# Patient Record
Sex: MALE | Race: WHITE | NOT HISPANIC OR LATINO | Employment: FULL TIME | URBAN - NONMETROPOLITAN AREA
[De-identification: names, ages, dates, MRNs, and addresses within clinical notes are randomized per-mention and may not be internally consistent; named-entity substitution may affect disease eponyms.]

---

## 2017-01-04 ENCOUNTER — OFFICE VISIT (OUTPATIENT)
Dept: CARDIOLOGY | Facility: CLINIC | Age: 52
End: 2017-01-04

## 2017-01-04 VITALS
OXYGEN SATURATION: 98 % | WEIGHT: 188.5 LBS | DIASTOLIC BLOOD PRESSURE: 79 MMHG | BODY MASS INDEX: 24.98 KG/M2 | HEART RATE: 65 BPM | SYSTOLIC BLOOD PRESSURE: 116 MMHG | HEIGHT: 73 IN

## 2017-01-04 DIAGNOSIS — I25.2 OLD MI (MYOCARDIAL INFARCTION): ICD-10-CM

## 2017-01-04 DIAGNOSIS — I25.10 CORONARY ARTERY DISEASE INVOLVING NATIVE CORONARY ARTERY OF NATIVE HEART WITHOUT ANGINA PECTORIS: Primary | ICD-10-CM

## 2017-01-04 PROCEDURE — 99213 OFFICE O/P EST LOW 20 MIN: CPT | Performed by: INTERNAL MEDICINE

## 2017-01-04 NOTE — MR AVS SNAPSHOT
Claude E Clement   1/4/2017 12:40 PM   Office Visit    Dept Phone:  [phone]   Encounter #:  [id number]    Provider:  Cameron Villa DO   Department:  [facility] CARDIOLOGY                Your Full Care Plan              Today's Medication Changes          These changes are accurate as of: 1/4/17  1:09 PM.  If you have any questions, ask your nurse or doctor.               Medication(s)that have changed:     aspirin  MG tablet   Take 1 tablet by mouth Daily.   What changed:  Another medication with the same name was removed. Continue taking this medication, and follow the directions you see here.       atorvastatin 80 MG tablet   Commonly known as:  LIPITOR   Take 1 tablet by mouth Daily.   What changed:  Another medication with the same name was removed. Continue taking this medication, and follow the directions you see here.       carvedilol 3.125 MG tablet   Commonly known as:  COREG   Take 1 tablet by mouth 2 (Two) Times a Day.   What changed:  Another medication with the same name was removed. Continue taking this medication, and follow the directions you see here.       clopidogrel 75 MG tablet   Commonly known as:  PLAVIX   Take 1 tablet by mouth Daily.   What changed:  Another medication with the same name was removed. Continue taking this medication, and follow the directions you see here.       lisinopril 10 MG tablet   Commonly known as:  PRINIVIL,ZESTRIL   Take 1 tablet by mouth Daily.   What changed:  Another medication with the same name was removed. Continue taking this medication, and follow the directions you see here.         Stop taking medication(s)listed here:     HYDROcodone-acetaminophen 5-325 MG per tablet   Commonly known as:  NORCO   Stopped by:  Cameron Villa DO                      Your Updated Medication List          This list is accurate as of: 1/4/17  1:09 PM.  Always use your most recent med list.                aspirin  MG  "tablet   Take 1 tablet by mouth Daily.       atorvastatin 80 MG tablet   Commonly known as:  LIPITOR   Take 1 tablet by mouth Daily.       carvedilol 3.125 MG tablet   Commonly known as:  COREG   Take 1 tablet by mouth 2 (Two) Times a Day.       clopidogrel 75 MG tablet   Commonly known as:  PLAVIX   Take 1 tablet by mouth Daily.       gabapentin 600 MG tablet   Commonly known as:  NEURONTIN       lisinopril 10 MG tablet   Commonly known as:  PRINIVIL,ZESTRIL   Take 1 tablet by mouth Daily.               You Were Diagnosed With        Codes Comments    Coronary artery disease involving native coronary artery of native heart without angina pectoris    -  Primary ICD-10-CM: I25.10  ICD-9-CM: 414.01     Old MI (myocardial infarction)     ICD-10-CM: I25.2  ICD-9-CM: 412       Instructions     None    Patient Instructions History      Upcoming Appointments     Visit Type Date Time Department    FOLLOW UP 1/4/2017 12:40 PM [facility]    FOLLOW UP 5/4/2017 12:00 PM [facility] [place] [place] [facility]      MyChart Signup     Our records indicate that you have declined [facility] [employer]hart signup. If you would like to sign up for [employer], please email [email or web address] or call [phone] to obtain an activation code.             Other Info from Your Visit           Your Appointments     May 04, 2017 12:00 PM EDT   Follow Up with Cameron Villa DO   [facility] MEDICAL [facility] CARDIOLOGY (--)    [address] [place] [id number] [phone]           Arrive 15 minutes prior to appointment.              Allergies     No Known Allergies      Reason for Visit     Follow-up OLD MI      Vital Signs     Blood Pressure Pulse Height Weight Oxygen Saturation Body Mass Index    116/79 (BP Location: Right arm, Patient Position: Sitting) 65 73\" (185.4 cm) 188 lb 8 oz (85.5 kg) 98% 24.87 kg/m2    Smoking Status                   Current Every Day Smoker           Problems and Diagnoses Noted     Coronary artery " disease involving native coronary artery of native heart    Old MI (myocardial infarction)

## 2017-01-04 NOTE — PROGRESS NOTES
Subjective   NAME:    Claude E Campbell   :      1965  DATE:    2017    Claude E Campbell is a 51-year-old  male who had a inferior lateral myocardial infarction in 2016.  He underwent percutaneous stent placement to his right coronary.  He denies any further chest discomfort.  Although he has different sensations that occur.  He is active back at work.  He admits to smoking a half a pack cigarettes a day and drinking a sixpack a day which is down from 16 years day.    REASON FOR VISIT:  Chief Complaint   Patient presents with   • Follow-up     OLD MI       HISTORY:  PAST MEDICAL HISTORY:   Past Medical History   Diagnosis Date   • Myocardial infarction        SURGICAL HISTORY:   Past Surgical History   Procedure Laterality Date   • Coronary stent placement         SOCIAL HISTORY:   Social History     Social History   • Marital status:      Spouse name: N/A   • Number of children: N/A   • Years of education: N/A     Social History Main Topics   • Smoking status: Current Every Day Smoker     Packs/day: 0.75     Types: Cigarettes   • Smokeless tobacco: Current User     Types: Snuff   • Alcohol use 3.6 oz/week     6 Cans of beer per week      Comment: COUPLE PER DAY    • Drug use: No   • Sexual activity: Not Asked     Other Topics Concern   • None     Social History Narrative       FAMILY HISTORY:   Family History   Problem Relation Age of Onset   • Heart attack Father    • Heart attack Sister      X2   • Heart disease Brother    • Coronary artery disease Other        REVIEW OF SYSTEMS:  Review of Systems   Constitutional: Negative for activity change, appetite change and fatigue.   HENT: Negative for congestion and tinnitus.    Eyes: Positive for visual disturbance.   Respiratory: Negative for cough, chest tightness and shortness of breath.    Cardiovascular: Negative for chest pain, palpitations and leg swelling.   Gastrointestinal: Negative for blood in stool, nausea and  "vomiting.   Endocrine: Negative for cold intolerance, heat intolerance and polyuria.   Genitourinary: Negative for dysuria.   Musculoskeletal: Negative for myalgias and neck pain.   Skin: Negative for rash.   Neurological: Negative for dizziness, syncope, weakness and light-headedness.   Hematological: Does not bruise/bleed easily.   Psychiatric/Behavioral: Negative for confusion and sleep disturbance.       Objective       PHYSICAL EXAMINATION:  Physical Exam   Constitutional: He is oriented to person, place, and time. He appears well-developed and well-nourished.   HENT:   Head: Normocephalic and atraumatic.   Eyes: Conjunctivae and EOM are normal. Pupils are equal, round, and reactive to light.   Neck: Normal range of motion. Neck supple. No JVD present. No tracheal deviation present. No thyromegaly present.   Cardiovascular: Normal rate, regular rhythm, normal heart sounds and intact distal pulses.  Exam reveals no gallop and no friction rub.    No murmur heard.  Pulmonary/Chest: Effort normal and breath sounds normal. No respiratory distress. He has no wheezes. He has no rales. He exhibits no tenderness.   Abdominal: Soft. Bowel sounds are normal. He exhibits no distension and no mass. There is no tenderness. No hernia.   Musculoskeletal: Normal range of motion. He exhibits no edema, tenderness or deformity.   Lymphadenopathy:     He has no cervical adenopathy.   Neurological: He is alert and oriented to person, place, and time. He has normal reflexes. He displays normal reflexes. No cranial nerve deficit. He exhibits normal muscle tone. Coordination normal.   Skin: Skin is warm and dry.   Psychiatric: He has a normal mood and affect. His behavior is normal. Judgment and thought content normal.       VITAL SIGNS:   Visit Vitals   • /79 (BP Location: Right arm, Patient Position: Sitting)   • Pulse 65   • Ht 73\" (185.4 cm)   • Wt 188 lb 8 oz (85.5 kg)   • SpO2 98%   • BMI 24.87 kg/m2       Procedure "   Procedures         Assessment/Plan     Problems Addressed this Visit        Cardiovascular and Mediastinum    Coronary artery disease involving native coronary artery of native heart - Primary    Relevant Orders    Stress Test With Myocardial Perfusion One Day    Old MI (myocardial infarction)    Relevant Orders    Stress Test With Myocardial Perfusion One Day           Orders Placed This Encounter   Procedures   • Stress Test With Myocardial Perfusion One Day     Standing Status:   Future     Standing Expiration Date:   1/4/2018     Order Specific Question:   What stress agent will be used?     Answer:   Exercise with possible pharmacologic     Order Specific Question:   Reason for exam?     Answer:   Known Coronary Artery Disease    1.  Continue current medical regimen.  2.  We recommended he decrease his alcohol tobacco intake.  3.  We recommend that he increase his level of aerobic activity.  4.  We will schedule him for a treadmill stress test in 3 months.    Return in about 4 months (around 5/4/2017).    Current Outpatient Prescriptions:   •  aspirin  MG tablet, Take 1 tablet by mouth Daily., Disp: 30 tablet, Rfl: 11  •  atorvastatin (LIPITOR) 80 MG tablet, Take 1 tablet by mouth Daily., Disp: 30 tablet, Rfl: 11  •  carvedilol (COREG) 3.125 MG tablet, Take 1 tablet by mouth 2 (Two) Times a Day., Disp: 60 tablet, Rfl: 11  •  clopidogrel (PLAVIX) 75 MG tablet, Take 1 tablet by mouth Daily., Disp: 30 tablet, Rfl: 11  •  lisinopril (PRINIVIL,ZESTRIL) 10 MG tablet, Take 1 tablet by mouth Daily., Disp: 30 tablet, Rfl: 11  •  gabapentin (NEURONTIN) 600 MG tablet, Take 600 mg by mouth 3 (three) times a day., Disp: , Rfl:                  Cameron Villa, , [facility], FACC, FACOI, FCCP

## 2017-03-21 RX ORDER — ATORVASTATIN CALCIUM 80 MG/1
80 TABLET, FILM COATED ORAL DAILY
Qty: 30 TABLET | Refills: 11 | Status: SHIPPED | OUTPATIENT
Start: 2017-03-21 | End: 2018-04-12 | Stop reason: SDUPTHER

## 2017-03-21 RX ORDER — LISINOPRIL 10 MG/1
10 TABLET ORAL DAILY
Qty: 30 TABLET | Refills: 11 | Status: SHIPPED | OUTPATIENT
Start: 2017-03-21 | End: 2018-04-12 | Stop reason: SDUPTHER

## 2017-03-21 RX ORDER — CLOPIDOGREL BISULFATE 75 MG/1
75 TABLET ORAL DAILY
Qty: 30 TABLET | Refills: 11 | Status: SHIPPED | OUTPATIENT
Start: 2017-03-21 | End: 2018-04-12 | Stop reason: SDUPTHER

## 2017-03-21 RX ORDER — ASPIRIN 325 MG
325 TABLET, DELAYED RELEASE (ENTERIC COATED) ORAL DAILY
Qty: 30 TABLET | Refills: 11 | Status: SHIPPED | OUTPATIENT
Start: 2017-03-21 | End: 2018-04-12 | Stop reason: SDUPTHER

## 2017-03-21 RX ORDER — CARVEDILOL 3.12 MG/1
3.12 TABLET ORAL 2 TIMES DAILY
Qty: 60 TABLET | Refills: 11 | Status: SHIPPED | OUTPATIENT
Start: 2017-03-21 | End: 2018-04-12 | Stop reason: SDUPTHER

## 2017-05-16 ENCOUNTER — OFFICE VISIT (OUTPATIENT)
Dept: CARDIOLOGY | Facility: CLINIC | Age: 52
End: 2017-05-16

## 2017-05-16 VITALS
SYSTOLIC BLOOD PRESSURE: 154 MMHG | DIASTOLIC BLOOD PRESSURE: 92 MMHG | HEIGHT: 72 IN | OXYGEN SATURATION: 97 % | BODY MASS INDEX: 24.85 KG/M2 | HEART RATE: 63 BPM | WEIGHT: 183.5 LBS

## 2017-05-16 DIAGNOSIS — R06.02 SHORTNESS OF BREATH: ICD-10-CM

## 2017-05-16 DIAGNOSIS — Z72.0 TOBACCO ABUSE: ICD-10-CM

## 2017-05-16 DIAGNOSIS — E78.2 MIXED HYPERLIPIDEMIA: ICD-10-CM

## 2017-05-16 DIAGNOSIS — I25.10 CORONARY ARTERY DISEASE INVOLVING NATIVE CORONARY ARTERY OF NATIVE HEART WITHOUT ANGINA PECTORIS: Primary | ICD-10-CM

## 2017-05-16 DIAGNOSIS — I10 ESSENTIAL HYPERTENSION: ICD-10-CM

## 2017-05-16 DIAGNOSIS — I25.2 OLD MI (MYOCARDIAL INFARCTION): ICD-10-CM

## 2017-05-16 PROCEDURE — 99214 OFFICE O/P EST MOD 30 MIN: CPT | Performed by: INTERNAL MEDICINE

## 2017-05-20 PROBLEM — Z72.0 TOBACCO ABUSE: Status: ACTIVE | Noted: 2017-05-20

## 2017-05-20 PROBLEM — I10 ESSENTIAL HYPERTENSION: Status: ACTIVE | Noted: 2017-05-20

## 2017-05-20 PROBLEM — E78.2 MIXED HYPERLIPIDEMIA: Status: ACTIVE | Noted: 2017-05-20

## 2017-06-02 ENCOUNTER — HOSPITAL ENCOUNTER (OUTPATIENT)
Dept: CARDIOLOGY | Facility: HOSPITAL | Age: 52
Discharge: HOME | End: 2017-06-02
Attending: INTERNAL MEDICINE | Admitting: INTERNAL MEDICINE

## 2017-06-02 ENCOUNTER — APPOINTMENT (OUTPATIENT)
Dept: CARDIOLOGY | Facility: HOSPITAL | Age: 52
End: 2017-06-02
Attending: INTERNAL MEDICINE

## 2017-06-02 DIAGNOSIS — I25.10 CORONARY ARTERY DISEASE INVOLVING NATIVE CORONARY ARTERY OF NATIVE HEART WITHOUT ANGINA PECTORIS: ICD-10-CM

## 2017-06-02 DIAGNOSIS — R06.02 SHORTNESS OF BREATH: ICD-10-CM

## 2017-06-02 LAB
BH CV ECHO MEAS - ACS: 1.9 CM
BH CV ECHO MEAS - AO ROOT AREA (BSA CORRECTED): 1.5
BH CV ECHO MEAS - AO ROOT AREA: 7.3 CM^2
BH CV ECHO MEAS - AO ROOT DIAM: 3 CM
BH CV ECHO MEAS - BSA(HAYCOCK): 2.1 M^2
BH CV ECHO MEAS - BSA: 2.1 M^2
BH CV ECHO MEAS - BZI_BMI: 24.8 KILOGRAMS/M^2
BH CV ECHO MEAS - BZI_METRIC_HEIGHT: 182.9 CM
BH CV ECHO MEAS - BZI_METRIC_WEIGHT: 83 KG
BH CV ECHO MEAS - CONTRAST EF 4CH: 48.6 ML/M^2
BH CV ECHO MEAS - EDV(CUBED): 217.4 ML
BH CV ECHO MEAS - EDV(MOD-SP4): 138 ML
BH CV ECHO MEAS - EDV(TEICH): 180.9 ML
BH CV ECHO MEAS - EF(CUBED): 61.8 %
BH CV ECHO MEAS - EF(MOD-SP4): 48.6 %
BH CV ECHO MEAS - EF(TEICH): 52.5 %
BH CV ECHO MEAS - ESV(CUBED): 83.1 ML
BH CV ECHO MEAS - ESV(MOD-SP4): 71 ML
BH CV ECHO MEAS - ESV(TEICH): 86 ML
BH CV ECHO MEAS - FS: 27.4 %
BH CV ECHO MEAS - IVS/LVPW: 0.88
BH CV ECHO MEAS - IVSD: 0.84 CM
BH CV ECHO MEAS - LA DIMENSION: 3.6 CM
BH CV ECHO MEAS - LA/AO: 1.2
BH CV ECHO MEAS - LV DIASTOLIC VOL/BSA (35-75): 67.3 ML/M^2
BH CV ECHO MEAS - LV MASS(C)D: 216 GRAMS
BH CV ECHO MEAS - LV MASS(C)DI: 105.3 GRAMS/M^2
BH CV ECHO MEAS - LV SYSTOLIC VOL/BSA (12-30): 34.6 ML/M^2
BH CV ECHO MEAS - LVIDD: 6 CM
BH CV ECHO MEAS - LVIDS: 4.4 CM
BH CV ECHO MEAS - LVLD AP4: 7.8 CM
BH CV ECHO MEAS - LVLS AP4: 7.3 CM
BH CV ECHO MEAS - LVOT AREA (M): 3.1 CM^2
BH CV ECHO MEAS - LVOT AREA: 3.2 CM^2
BH CV ECHO MEAS - LVOT DIAM: 2 CM
BH CV ECHO MEAS - LVPWD: 0.95 CM
BH CV ECHO MEAS - MV A MAX VEL: 17.3 CM/SEC
BH CV ECHO MEAS - MV E MAX VEL: 91.8 CM/SEC
BH CV ECHO MEAS - MV E/A: 5.3
BH CV ECHO MEAS - PA ACC SLOPE: 417.7 CM/SEC^2
BH CV ECHO MEAS - PA ACC TIME: 0.17 SEC
BH CV ECHO MEAS - PA PR(ACCEL): 1.4 MMHG
BH CV ECHO MEAS - SI(CUBED): 65.5 ML/M^2
BH CV ECHO MEAS - SI(MOD-SP4): 32.7 ML/M^2
BH CV ECHO MEAS - SI(TEICH): 46.3 ML/M^2
BH CV ECHO MEAS - SV(CUBED): 134.4 ML
BH CV ECHO MEAS - SV(MOD-SP4): 67 ML
BH CV ECHO MEAS - SV(TEICH): 94.9 ML
LV EF 2D ECHO EST: 50 %

## 2017-06-02 PROCEDURE — 93306 TTE W/DOPPLER COMPLETE: CPT | Performed by: INTERNAL MEDICINE

## 2017-06-02 PROCEDURE — 93306 TTE W/DOPPLER COMPLETE: CPT

## 2017-06-06 ENCOUNTER — TELEPHONE (OUTPATIENT)
Dept: CARDIOLOGY | Facility: CLINIC | Age: 52
End: 2017-06-06

## 2017-06-06 NOTE — TELEPHONE ENCOUNTER
----- Message from Rafal Aldana MD sent at 6/5/2017  5:12 PM EDT -----  Let him know that his echo was normal    sdf      PTS WIFE MELVINA RETURNED MY CALL ABOUT CLAUDE'S ECHO RESULTS. I MADE HER AWARE THAT PER DR. ALDANA HIS ECHO WAS NORMAL.    PT WIFE MELVINA AWARE & UNDERSTANDS.    [facility]

## 2017-06-06 NOTE — TELEPHONE ENCOUNTER
----- Message from Rafal Aldana MD sent at 6/5/2017  5:12 PM EDT -----  Let him know that his echo was normal    sdf

## 2017-07-05 ENCOUNTER — APPOINTMENT (OUTPATIENT)
Dept: GENERAL RADIOLOGY | Facility: HOSPITAL | Age: 52
End: 2017-07-05

## 2017-07-05 ENCOUNTER — HOSPITAL ENCOUNTER (EMERGENCY)
Facility: HOSPITAL | Age: 52
Discharge: HOME | End: 2017-07-06
Admitting: EMERGENCY MEDICINE

## 2017-07-05 DIAGNOSIS — M70.51 PATELLAR BURSITIS OF RIGHT KNEE: Primary | ICD-10-CM

## 2017-07-05 DIAGNOSIS — S86.911A KNEE STRAIN, RIGHT, INITIAL ENCOUNTER: ICD-10-CM

## 2017-07-05 PROCEDURE — 99283 EMERGENCY DEPT VISIT LOW MDM: CPT

## 2017-07-05 PROCEDURE — 73562 X-RAY EXAM OF KNEE 3: CPT | Performed by: RADIOLOGY

## 2017-07-05 PROCEDURE — 73562 X-RAY EXAM OF KNEE 3: CPT

## 2017-07-06 ENCOUNTER — TELEPHONE (OUTPATIENT)
Dept: EMERGENCY DEPT | Facility: HOSPITAL | Age: 52
End: 2017-07-06

## 2017-07-06 VITALS
SYSTOLIC BLOOD PRESSURE: 125 MMHG | HEIGHT: 72 IN | OXYGEN SATURATION: 97 % | DIASTOLIC BLOOD PRESSURE: 84 MMHG | WEIGHT: 180 LBS | TEMPERATURE: 97.8 F | RESPIRATION RATE: 18 BRPM | BODY MASS INDEX: 24.38 KG/M2 | HEART RATE: 80 BPM

## 2017-07-06 RX ORDER — DOXYCYCLINE 100 MG/1
100 CAPSULE ORAL 2 TIMES DAILY
Qty: 14 CAPSULE | Refills: 0 | Status: SHIPPED | OUTPATIENT
Start: 2017-07-06 | End: 2017-07-13

## 2017-07-06 NOTE — ED NOTES
Pt returned at 1800 on 07/06/17 and received knee immobilizer, referral to follow up with Dr. Schaffer, and prescription for Norco 5mg/325mg written by Dr. Juarez when pt reports increasing pain in R knee.  Pt verb understanding of instruction to follow up and medication instructions.  CSM present after application of knee immobilizer.  Pt counseled on importance of using crutches for ambulation and no weight bearing on knee.  Pt verb understanding.  Pt instructed to return to ED for worsening symptoms.  Pt verb understanding.     Mirian Hylton RN  07/06/17 [id number]

## 2017-07-06 NOTE — DISCHARGE INSTRUCTIONS
Please use crutches as needed for minimal weight bearing. Please don't put pressure on Right knee cap. Please start and finish doxycyline and take tylenol for pain. Please follow up with you PCP Friday and return to ER if symptoms of infection and pain increase.    Hypertension  Hypertension, commonly called high blood pressure, is when the force of blood pumping through your arteries is too strong. Your arteries are the blood vessels that carry blood from your heart throughout your body. A blood pressure reading consists of a higher number over a lower number, such as 110/72. The higher number (systolic) is the pressure inside your arteries when your heart pumps. The lower number (diastolic) is the pressure inside your arteries when your heart relaxes. Ideally you want your blood pressure below 120/80.  Hypertension forces your heart to work harder to pump blood. Your arteries may become narrow or stiff. Having untreated or uncontrolled hypertension can cause heart attack, stroke, kidney disease, and other problems.  RISK FACTORS  Some risk factors for high blood pressure are controllable. Others are not.   Risk factors you cannot control include:   · Race. You may be at higher risk if you are .  · Age. Risk increases with age.  · Gender. Men are at higher risk than women before age 45 years. After age 65, women are at higher risk than men.  Risk factors you can control include:  · Not getting enough exercise or physical activity.  · Being overweight.  · Getting too much fat, sugar, calories, or salt in your diet.  · Drinking too much alcohol.  SIGNS AND SYMPTOMS  Hypertension does not usually cause signs or symptoms. Extremely high blood pressure (hypertensive crisis) may cause headache, anxiety, shortness of breath, and nosebleed.  DIAGNOSIS  To check if you have hypertension, your health care provider will measure your blood pressure while you are seated, with your arm held at the level of your  heart. It should be measured at least twice using the same arm. Certain conditions can cause a difference in blood pressure between your right and left arms. A blood pressure reading that is higher than normal on one occasion does not mean that you need treatment. If it is not clear whether you have high blood pressure, you may be asked to return on a different day to have your blood pressure checked again. Or, you may be asked to monitor your blood pressure at home for 1 or more weeks.  TREATMENT  Treating high blood pressure includes making lifestyle changes and possibly taking medicine. Living a healthy lifestyle can help lower high blood pressure. You may need to change some of your habits.  Lifestyle changes may include:  · Following the DASH diet. This diet is high in fruits, vegetables, and whole grains. It is low in salt, red meat, and added sugars.  · Keep your sodium intake below 2,300 mg per day.  · Getting at least 30-45 minutes of aerobic exercise at least 4 times per week.  · Losing weight if necessary.  · Not smoking.  · Limiting alcoholic beverages.  · Learning ways to reduce stress.  Your health care provider may prescribe medicine if lifestyle changes are not enough to get your blood pressure under control, and if one of the following is true:  · You are 18-59 years of age and your systolic blood pressure is above 140.  · You are 60 years of age or older, and your systolic blood pressure is above 150.  · Your diastolic blood pressure is above 90.  · You have diabetes, and your systolic blood pressure is over 140 or your diastolic blood pressure is over 90.  · You have kidney disease and your blood pressure is above 140/90.  · You have heart disease and your blood pressure is above 140/90.  Your personal target blood pressure may vary depending on your medical conditions, your age, and other factors.  HOME CARE INSTRUCTIONS  · Have your blood pressure rechecked as directed by your health care  provider.    · Take medicines only as directed by your health care provider. Follow the directions carefully. Blood pressure medicines must be taken as prescribed. The medicine does not work as well when you skip doses. Skipping doses also puts you at risk for problems.  · Do not smoke.    · Monitor your blood pressure at home as directed by your health care provider.   SEEK MEDICAL CARE IF:   · You think you are having a reaction to medicines taken.  · You have recurrent headaches or feel dizzy.  · You have swelling in your ankles.  · You have trouble with your vision.  SEEK IMMEDIATE MEDICAL CARE IF:  · You develop a severe headache or confusion.  · You have unusual weakness, numbness, or feel faint.  · You have severe chest or abdominal pain.  · You vomit repeatedly.  · You have trouble breathing.  MAKE SURE YOU:   · Understand these instructions.  · Will watch your condition.  · Will get help right away if you are not doing well or get worse.     This information is not intended to replace advice given to you by your health care provider. Make sure you discuss any questions you have with your health care provider.     Document Released: 12/18/2006 Document Revised: 05/03/2016 Document Reviewed: 10/10/2014  [employer] Interactive Patient Education ©2017 [employer]

## 2017-07-06 NOTE — ED NOTES
Portable X-ray in room at this time to take X-ray of right knee     Yuliet Vivar RN  07/05/17 7433

## 2017-07-06 NOTE — ED PROVIDER NOTES
Subjective   HPI Comments: This is a 52-year-old male patient presents to ER with chief complaint of right knee pain.  Saturday the patient was pressure washing his house and his deck.  The deck became slick.  He slipped and fell twisting the right knee.  Over the course of the past 4 days, he noted increased swelling,  redness and pain in the right knee extending into the right lower leg region.  He has utilized heat, ice, and Neurontin without relief of his symptoms.  Symptoms are aggravated by weightbearing as well as range of motion of the right knee.  The patient has been weightbearing without ambulatory assistive devices.  Patient's job does require him to be on his knees a lot.    Patient is a 52 y.o. male presenting with lower extremity pain.   Lower Extremity Issue   Location:  Knee  Time since incident:  4 days  Injury: yes    Mechanism of injury: fall    Fall:     Fall occurred: On wet deck.    Height of fall:  0 ft    Impact surface: deck.    Point of impact:  Knees    Entrapped after fall: no    Knee location:  R knee  Pain details:     Quality:  Aching and dull    Radiates to:  R leg    Severity:  Mild    Onset quality:  Gradual    Duration:  4 days    Timing:  Intermittent    Progression:  Worsening  Chronicity:  New  Dislocation: no    Foreign body present:  No foreign bodies  Prior injury to area:  No  Relieved by:  Ice, heat and elevation (Neurontin)  Worsened by:  Bearing weight, flexion and extension  Associated symptoms: decreased ROM, muscle weakness, stiffness and swelling    Associated symptoms: no back pain, no fatigue, no fever, no numbness and no tingling        Review of Systems   Constitutional: Negative.  Negative for chills, diaphoresis, fatigue and fever.   HENT: Negative.  Negative for congestion and sore throat.    Respiratory: Negative.  Negative for cough, shortness of breath and wheezing.    Cardiovascular: Negative.  Negative for chest pain and palpitations.   Gastrointestinal:  Negative.  Negative for abdominal pain, constipation, diarrhea, nausea and vomiting.   Endocrine: Negative.    Genitourinary: Negative.  Negative for dysuria, frequency and urgency.   Musculoskeletal: Positive for stiffness. Negative for back pain.        Right knee pain, swelling and redness   Skin: Negative.    Neurological: Negative.    Psychiatric/Behavioral: Negative.    All other systems reviewed and are negative.      Past Medical History:   Diagnosis Date   • Myocardial infarction        No Known Allergies    Past Surgical History:   Procedure Laterality Date   • CORONARY STENT PLACEMENT         Family History   Problem Relation Age of Onset   • Heart attack Father    • Heart attack Sister      X2   • Heart disease Brother    • Coronary artery disease Other        Social History     Social History   • Marital status:      Spouse name: N/A   • Number of children: N/A   • Years of education: N/A     Social History Main Topics   • Smoking status: Current Every Day Smoker     Packs/day: 0.75     Years: 20.00     Types: Cigarettes   • Smokeless tobacco: Current User     Types: Snuff   • Alcohol use 3.6 oz/week     6 Cans of beer per week      Comment: COUPLE PER DAY    • Drug use: No   • Sexual activity: Not Asked     Other Topics Concern   • None     Social History Narrative           Objective   Physical Exam   Constitutional: He is oriented to person, place, and time. He appears well-developed and well-nourished. No distress.   HENT:   Head: Normocephalic and atraumatic.   Right Ear: External ear normal.   Left Ear: External ear normal.   Nose: Nose normal.   Eyes: Conjunctivae and EOM are normal. Pupils are equal, round, and reactive to light.   Neck: Normal range of motion. Neck supple. No JVD present. No tracheal deviation present.   Cardiovascular: Normal rate, regular rhythm and normal heart sounds.    No murmur heard.  Pulmonary/Chest: Effort normal and breath sounds normal. No respiratory  distress. He has no wheezes.   Abdominal: Soft. Bowel sounds are normal. There is no tenderness.   Musculoskeletal: He exhibits edema and tenderness. He exhibits no deformity.   Right knee skin intact. Edema and erythema noted and extends into the right lower leg. Pre-patellar bursitis noted that is mild. No pus drainage. Neurovascular status and sensation in RLE intact.   Neurological: He is alert and oriented to person, place, and time. No cranial nerve deficit.   Skin: Skin is warm and dry. No rash noted. He is not diaphoretic. No erythema. No pallor.   Psychiatric: He has a normal mood and affect. His behavior is normal. Thought content normal.   Nursing note and vitals reviewed.      Procedures         ED Course  ED Course   Value Comment By Time   XR Knee 3 View Right Mild OA but no acute fractures per Dr. Peterson. CHER Patel 07/06 0015    Patient diagnosed with pre-patellar bursitis Right knee. Will d/c home with crutches and instructions for minimal weight bearing and no right knee pressure. Will give RX for abx and anti-inflammatory. Will f/u with PCP on Friday for re-evaluation. Will return to ER if infectious symptoms or pain don't improve or worsen. CHER Patel 07/06 0016                  MDM  Number of Diagnoses or Management Options  Knee strain, right, initial encounter:   Patellar bursitis of right knee:      Amount and/or Complexity of Data Reviewed  Tests in the radiology section of CPT®: ordered and reviewed  Discuss the patient with other providers: yes        Final diagnoses:   Patellar bursitis of right knee   Knee strain, right, initial encounter            CHER Patel  07/06/17 0027

## 2017-07-12 ENCOUNTER — OFFICE VISIT (OUTPATIENT)
Dept: ORTHOPEDIC SURGERY | Facility: CLINIC | Age: 52
End: 2017-07-12

## 2017-07-12 VITALS — BODY MASS INDEX: 24.38 KG/M2 | HEIGHT: 72 IN | WEIGHT: 180 LBS

## 2017-07-12 DIAGNOSIS — S82.141A TIBIAL PLATEAU FRACTURE, RIGHT, CLOSED, INITIAL ENCOUNTER: Primary | ICD-10-CM

## 2017-07-12 PROCEDURE — 99203 OFFICE O/P NEW LOW 30 MIN: CPT | Performed by: PHYSICIAN ASSISTANT

## 2017-07-12 NOTE — PROGRESS NOTES
New Patient Visit        Patient: Claude E Campbell  YOB: 1965  Date of encounter: 7/12/2017      History of Present Illness:   Claude E Campbell is a 52 y.o. male who is referred here today by [facility] emergency room for evaluation of acute injury to his right knee.  He states on July 1, 2017 was pressure washing a porch when he slipped and fell landing on the right knee.  He states as he fell he twisted the knee.  He states he has significant pain and swelling.  He states he waited a few days before he went to the emergency room.  He is complaining of increased pain along his knee with weightbearing activities.    PMH:   Patient Active Problem List   Diagnosis   • Coronary artery disease involving native coronary artery of native heart   • Old MI (myocardial infarction)   • Mixed hyperlipidemia   • Essential hypertension   • Tobacco abuse     Past Medical History:   Diagnosis Date   • Myocardial infarction        PSH:  Past Surgical History:   Procedure Laterality Date   • CORONARY STENT PLACEMENT         Allergies:   No Known Allergies    Medications:     Current Outpatient Prescriptions:   •  aspirin  MG tablet, Take 1 tablet by mouth Daily., Disp: 30 tablet, Rfl: 11  •  atorvastatin (LIPITOR) 80 MG tablet, Take 1 tablet by mouth Daily., Disp: 30 tablet, Rfl: 11  •  carvedilol (COREG) 3.125 MG tablet, Take 1 tablet by mouth 2 (Two) Times a Day., Disp: 60 tablet, Rfl: 11  •  clopidogrel (PLAVIX) 75 MG tablet, Take 1 tablet by mouth Daily., Disp: 30 tablet, Rfl: 11  •  doxycycline (MONODOX) 100 MG capsule, Take 1 capsule by mouth 2 (Two) Times a Day for 7 days., Disp: 14 capsule, Rfl: 0  •  gabapentin (NEURONTIN) 600 MG tablet, Take 600 mg by mouth 3 (three) times a day., Disp: , Rfl:   •  lisinopril (PRINIVIL,ZESTRIL) 10 MG tablet, Take 1 tablet by mouth Daily., Disp: 30 tablet, Rfl: 11    Social History:  Social History     Social History   • Marital status:      Spouse  "name: N/A   • Number of children: N/A   • Years of education: N/A     Occupational History   • Not on file.     Social History Main Topics   • Smoking status: Current Every Day Smoker     Packs/day: 0.75     Years: 20.00     Types: Cigarettes   • Smokeless tobacco: Current User     Types: Snuff   • Alcohol use 3.6 oz/week     6 Cans of beer per week      Comment: COUPLE PER DAY    • Drug use: No   • Sexual activity: Not on file     Other Topics Concern   • Not on file     Social History Narrative       Family History:     Family History   Problem Relation Age of Onset   • Heart attack Father    • Heart attack Sister      X2   • Heart disease Brother    • Coronary artery disease Other        Review of Systems:   Review of Systems   Constitutional: Negative.    HENT: Negative.    Eyes: Negative.    Respiratory: Negative.    Cardiovascular: Negative.    Gastrointestinal: Negative.    Genitourinary: Negative.    Skin: Negative.    Neurological: Negative.    Hematological: Negative.    Psychiatric/Behavioral: Negative.        Physical Exam: 52 y.o. male  General Appearance:    Alert and oriented x 3, cooperative, in no acute distress                   Vitals:    07/12/17 1335   Weight: 180 lb (81.6 kg)   Height: 72\" (182.9 cm)                Musculoskeletal: Examination of his right knee reveals effusion to the knee.  He has tenderness along the lateral tibial plateau.  His motion was not tested secondary to known fracture.  His neurovascular status was intact.    Radiology:      3 views of the right knee were reviewed revealing a fracture through the lateral tibial plateau.    Assessment    ICD-10-CM ICD-9-CM   1. Tibial plateau fracture, right, closed, initial encounter S82.141A 823.00       Plan:  A 52-year-old male with acute injury to his right knee.  X-rays do reveal a lucency through the lateral tibial plateau.  This consistent with a fracture.  I would like to further evaluate this with a CT scan to make sure " that there is no depression of the fragment.  We will also place him in a T from brace locked in full extension.  He will return back upon completion a CT scan.    Victoria Watts PAC    Cc Bruno RODGERS

## 2017-07-13 ENCOUNTER — TELEPHONE (OUTPATIENT)
Dept: ORTHOPEDIC SURGERY | Facility: CLINIC | Age: 52
End: 2017-07-13

## 2017-07-13 NOTE — TELEPHONE ENCOUNTER
----- Message from Lay Canela sent at 7/13/2017 12:20 PM EDT -----  Regarding: TRIAGE NOTE  PT SAW ELENA YESTERDAY, SHE ORDERED A CT, HOWEVER PT IS COMPLAINING OF A LOT OF PAIN AND WANTS TO KNOW IF HE CAN GET SOME MEDICATION FOR IT.      MELVINA - WIFE  PH: [phone]

## 2017-07-19 ENCOUNTER — HOSPITAL ENCOUNTER (OUTPATIENT)
Dept: MRI IMAGING | Facility: HOSPITAL | Age: 52
Discharge: HOME | End: 2017-07-19
Admitting: PHYSICIAN ASSISTANT

## 2017-07-19 PROCEDURE — 73721 MRI JNT OF LWR EXTRE W/O DYE: CPT

## 2017-07-19 PROCEDURE — 73721 MRI JNT OF LWR EXTRE W/O DYE: CPT | Performed by: RADIOLOGY

## 2017-07-27 ENCOUNTER — HOSPITAL ENCOUNTER (OUTPATIENT)
Dept: GENERAL RADIOLOGY | Facility: HOSPITAL | Age: 52
Discharge: HOME | End: 2017-07-27
Admitting: PHYSICIAN ASSISTANT

## 2017-07-27 ENCOUNTER — OFFICE VISIT (OUTPATIENT)
Dept: ORTHOPEDIC SURGERY | Facility: CLINIC | Age: 52
End: 2017-07-27

## 2017-07-27 VITALS — HEIGHT: 72 IN | WEIGHT: 180 LBS | BODY MASS INDEX: 24.38 KG/M2

## 2017-07-27 DIAGNOSIS — S82.141D TIBIAL PLATEAU FRACTURE, RIGHT, CLOSED, WITH ROUTINE HEALING, SUBSEQUENT ENCOUNTER: Primary | ICD-10-CM

## 2017-07-27 PROCEDURE — 99213 OFFICE O/P EST LOW 20 MIN: CPT | Performed by: PHYSICIAN ASSISTANT

## 2017-07-27 PROCEDURE — 73562 X-RAY EXAM OF KNEE 3: CPT

## 2017-07-27 PROCEDURE — 73560 X-RAY EXAM OF KNEE 1 OR 2: CPT | Performed by: RADIOLOGY

## 2017-07-27 NOTE — PROGRESS NOTES
Claude E Campbell   :1965    Date of encounter:2017        HPI:  Claude E Campbell is a 52 y.o. male with acute injury to his right knee.  It's now been 3 weeks since initial injury.  He presents here today to review MRI.  He still wearing the T from brace will complains of pain and swelling at the end of the day after PA he's been standing on it all day.  He denies paresthesias.      Exam:   Examination of the right knee still reveals mild swelling and tenderness along the lateral tibial plateau.  There is no gross instability.  His neurovascular status is intact.    Radiology:  2 views of the right knee reveal no change in alignment and nondisplaced tibial plateau fracture     MRI of the knee was reviewed revealing some prepatellar edema bony contusion lateral aspect of the anterior tibial plateau.    Assessment    ICD-10-CM ICD-9-CM   1. Tibial plateau fracture, right, closed, with routine healing, subsequent encounter S82.141D V54.16       Plan:   A 52-year-old male with a lateral tibial plateau fracture.  X-rays today reveal no change in alignment.  We also reviewed an MRI which reveals no significant depression of the fragment.  We will continue with an tear from brace.  He can bear weight as tolerated with the brace on.  He can remove the brace for bathing purposes.  He'll return back in 4 weeks for repeat x-rays and evaluation.    Victoria Woodward

## 2017-07-28 ENCOUNTER — TELEPHONE (OUTPATIENT)
Dept: ORTHOPEDIC SURGERY | Facility: CLINIC | Age: 52
End: 2017-07-28

## 2017-07-28 NOTE — TELEPHONE ENCOUNTER
Patient was seen in office 7-27-17, by Victoria KWON, he requested pain medications.  Dr Schaffer wrote script Norco.      Patient is aware pain medication prescription is ready for .

## 2017-08-23 DIAGNOSIS — S82.141D TIBIAL PLATEAU FRACTURE, RIGHT, CLOSED, WITH ROUTINE HEALING, SUBSEQUENT ENCOUNTER: Primary | ICD-10-CM

## 2017-08-25 DIAGNOSIS — S82.141D CLOSED FRACTURE OF RIGHT TIBIAL PLATEAU WITH ROUTINE HEALING: Primary | ICD-10-CM

## 2017-08-28 ENCOUNTER — OFFICE VISIT (OUTPATIENT)
Dept: ORTHOPEDIC SURGERY | Facility: CLINIC | Age: 52
End: 2017-08-28

## 2017-08-28 ENCOUNTER — HOSPITAL ENCOUNTER (OUTPATIENT)
Dept: GENERAL RADIOLOGY | Facility: HOSPITAL | Age: 52
Discharge: HOME | End: 2017-08-28
Admitting: PHYSICIAN ASSISTANT

## 2017-08-28 DIAGNOSIS — S82.141D CLOSED FRACTURE OF RIGHT TIBIAL PLATEAU WITH ROUTINE HEALING: ICD-10-CM

## 2017-08-28 DIAGNOSIS — S82.141D TIBIAL PLATEAU FRACTURE, RIGHT, CLOSED, WITH ROUTINE HEALING, SUBSEQUENT ENCOUNTER: Primary | ICD-10-CM

## 2017-08-28 PROCEDURE — 73562 X-RAY EXAM OF KNEE 3: CPT

## 2017-08-28 PROCEDURE — 73562 X-RAY EXAM OF KNEE 3: CPT | Performed by: RADIOLOGY

## 2017-08-28 PROCEDURE — 99213 OFFICE O/P EST LOW 20 MIN: CPT | Performed by: PHYSICIAN ASSISTANT

## 2017-08-28 NOTE — PROGRESS NOTES
Claude E Campbell   :1965    Date of encounter:2017        HPI:  Claude E Campbell is a 52 y.o. male who returns here today for follow-up of a nondisplaced lateral tibial plateau fracture.  He's now almost 2 months post injury.  He states that he's not worn his brace in the last few days.  He has complaints of mild pain with prolonged walking or standing but for the most part doing well.  He denies paresthesias.    PMH:   Patient Active Problem List   Diagnosis   • Coronary artery disease involving native coronary artery of native heart   • Old MI (myocardial infarction)   • Mixed hyperlipidemia   • Essential hypertension   • Tobacco abuse         Exam:   Examination of the right knee reveals no significant swelling or ecchymosis.  He has minimal tenderness along the lateral tibial plateau.  He has full range of motion.  His neurovascular status is intact.    Radiology:   AP and lateral views of the right knee were reviewed revealing the obliquely oriented fracture through the lateral tibial plateau with no displacement unchanged alignment.    Assessment    ICD-10-CM ICD-9-CM   1. Tibial plateau fracture, right, closed, with routine healing, subsequent encounter S82.141D V54.16       Plan:   52-year-old male with a right tibial plateau fracture.  X-rays today reveal no change in alignment.  Clinically he is doing well with little complaints of pain.  I have advised that he continue wearing his T ROM but he can unlock it and begin to flex his knee.  He will return back in 6 weeks for repeat x-rays and evaluation.    Victoria KWON    Cc Bruno Woodward APRN

## 2017-10-04 DIAGNOSIS — S82.141D CLOSED FRACTURE OF RIGHT TIBIAL PLATEAU WITH ROUTINE HEALING, SUBSEQUENT ENCOUNTER: Primary | ICD-10-CM

## 2017-10-09 ENCOUNTER — HOSPITAL ENCOUNTER (OUTPATIENT)
Dept: GENERAL RADIOLOGY | Facility: HOSPITAL | Age: 52
Discharge: HOME | End: 2017-10-09

## 2018-04-12 RX ORDER — ASPIRIN 325 MG
325 TABLET, DELAYED RELEASE (ENTERIC COATED) ORAL DAILY
Qty: 30 TABLET | Refills: 1 | Status: SHIPPED | OUTPATIENT
Start: 2018-04-12 | End: 2018-06-06 | Stop reason: SDUPTHER

## 2018-04-12 RX ORDER — CLOPIDOGREL BISULFATE 75 MG/1
75 TABLET ORAL DAILY
Qty: 30 TABLET | Refills: 1 | Status: SHIPPED | OUTPATIENT
Start: 2018-04-12 | End: 2018-06-06 | Stop reason: SDUPTHER

## 2018-04-12 RX ORDER — LISINOPRIL 10 MG/1
10 TABLET ORAL DAILY
Qty: 30 TABLET | Refills: 1 | Status: SHIPPED | OUTPATIENT
Start: 2018-04-12 | End: 2018-06-06 | Stop reason: SDUPTHER

## 2018-04-12 RX ORDER — ATORVASTATIN CALCIUM 80 MG/1
80 TABLET, FILM COATED ORAL DAILY
Qty: 30 TABLET | Refills: 1 | Status: SHIPPED | OUTPATIENT
Start: 2018-04-12 | End: 2018-06-06 | Stop reason: SDUPTHER

## 2018-04-12 RX ORDER — CARVEDILOL 3.12 MG/1
3.12 TABLET ORAL 2 TIMES DAILY WITH MEALS
Qty: 60 TABLET | Refills: 1 | Status: SHIPPED | OUTPATIENT
Start: 2018-04-12 | End: 2018-06-06 | Stop reason: SDUPTHER

## 2018-06-06 ENCOUNTER — OFFICE VISIT (OUTPATIENT)
Dept: CARDIOLOGY | Facility: CLINIC | Age: 53
End: 2018-06-06

## 2018-06-06 VITALS
HEIGHT: 73 IN | SYSTOLIC BLOOD PRESSURE: 147 MMHG | WEIGHT: 192.1 LBS | DIASTOLIC BLOOD PRESSURE: 88 MMHG | BODY MASS INDEX: 25.46 KG/M2 | HEART RATE: 58 BPM | OXYGEN SATURATION: 99 %

## 2018-06-06 DIAGNOSIS — I25.10 CORONARY ARTERY DISEASE INVOLVING NATIVE CORONARY ARTERY OF NATIVE HEART WITHOUT ANGINA PECTORIS: Primary | ICD-10-CM

## 2018-06-06 DIAGNOSIS — I10 ESSENTIAL HYPERTENSION: ICD-10-CM

## 2018-06-06 DIAGNOSIS — E78.2 MIXED HYPERLIPIDEMIA: ICD-10-CM

## 2018-06-06 DIAGNOSIS — I25.2 OLD MI (MYOCARDIAL INFARCTION): ICD-10-CM

## 2018-06-06 PROCEDURE — 99213 OFFICE O/P EST LOW 20 MIN: CPT | Performed by: INTERNAL MEDICINE

## 2018-06-06 RX ORDER — CARVEDILOL 3.12 MG/1
3.12 TABLET ORAL 2 TIMES DAILY WITH MEALS
Qty: 60 TABLET | Refills: 5 | Status: SHIPPED | OUTPATIENT
Start: 2018-06-06 | End: 2019-01-28 | Stop reason: SDUPTHER

## 2018-06-06 RX ORDER — CLOPIDOGREL BISULFATE 75 MG/1
75 TABLET ORAL DAILY
Qty: 30 TABLET | Refills: 5 | Status: SHIPPED | OUTPATIENT
Start: 2018-06-06 | End: 2019-01-28 | Stop reason: SDUPTHER

## 2018-06-06 RX ORDER — LISINOPRIL 20 MG/1
20 TABLET ORAL DAILY
Qty: 30 TABLET | Refills: 4 | Status: SHIPPED | OUTPATIENT
Start: 2018-06-06 | End: 2019-01-28 | Stop reason: SDUPTHER

## 2018-06-06 RX ORDER — ATORVASTATIN CALCIUM 80 MG/1
80 TABLET, FILM COATED ORAL DAILY
Qty: 30 TABLET | Refills: 5 | Status: SHIPPED | OUTPATIENT
Start: 2018-06-06 | End: 2019-01-28 | Stop reason: SDUPTHER

## 2018-06-06 RX ORDER — ASPIRIN 325 MG
325 TABLET, DELAYED RELEASE (ENTERIC COATED) ORAL DAILY
Qty: 30 TABLET | Refills: 5 | Status: SHIPPED | OUTPATIENT
Start: 2018-06-06 | End: 2019-01-28 | Stop reason: DRUGHIGH

## 2018-06-06 NOTE — PROGRESS NOTES
[facility] CARDIOLOGY  [address]  [place] [address]  Phone: [phone]  Fax: [phone]    06/06/2018    Chief Complaint: Routine followup of , CAD    History:   Claude E Campbell is a 53 y.o. male seen in followup, hx MI 2016. NO chest pain. No SOB. HTN, up today. Takes lisinopril but doesn't really check at home.            Claude is a very pleasant 52-year-old gentleman who presented with an acute inferior wall myocardial infarction in March 2016.  He states that he currently does not exercise on a routine basis but works in construction.  He notes that with physical activity he does not have any angina or anginal-like symptoms.  Unfortunately, he has continued to use tobacco products.  He does not maintain a blood pressure diary.  He cannot recall when he had his last fasting cholesterol obtained.  He does believe that he's been compliant with medical therapy.  It is not clear that he ever had an assessment of his overall LV systolic function.       Past Medical History:   Diagnosis Date   • Myocardial infarction        Past Social History:  Social History     Social History   • Marital status:      Social History Main Topics   • Smoking status: Current Every Day Smoker     Packs/day: 0.75     Years: 20.00     Types: Cigarettes   • Smokeless tobacco: Current User     Types: Snuff   • Alcohol use 3.6 oz/week     6 Cans of beer per week      Comment: COUPLE PER DAY    • Drug use: No     Other Topics Concern   • Not on file       Past Family History:  Family History   Problem Relation Age of Onset   • Heart attack Father    • Heart attack Sister         X2   • Heart disease Brother    • Coronary artery disease Other        Review of Systems:   Please see HPI  Constitution: No chills, no rigors, no unexplained weight loss or weight gain  Eyes:  No diplopia, no blurred vision, no loss of vision, conjunctiva is pink and sclera is anicteric  ENT:  No tinnitus, no  otorrhea, no epistaxis, no sore throat   Respiratory: No cough, no hemoptysis  Cardiovascular: see HPI  Gastrointestinal: No nausea, no vomiting, no hematemesis, no diarrhea or constipation, no melena  Genitourinary: No frequency of dysuria no hematuria  Integument: No pruritis and  no skin rash  Hematologic / Lymphatic: No excessive bleeding, easy bruising, fatigue, lymphadenopathy and petechiae  Musculoskeletal: No joint pain, joint stiffness, joint swelling, muscle pain, muscle weakness and neck pain  Neurological: No dizziness, headaches, light headedness, seizures and vertigo  Endocrine: No frequent urination and nocturia, temperature intolerance, weight gain, unintended and weight loss, unintended      Current Outpatient Prescriptions on File Prior to Visit   Medication Sig Dispense Refill   • aspirin  MG tablet Take 1 tablet by mouth Daily. 30 tablet 1   • atorvastatin (LIPITOR) 80 MG tablet Take 1 tablet by mouth Daily. 30 tablet 1   • carvedilol (COREG) 3.125 MG tablet Take 1 tablet by mouth 2 (Two) Times a Day With Meals. 60 tablet 1   • clopidogrel (PLAVIX) 75 MG tablet Take 1 tablet by mouth Daily. 30 tablet 1   • lisinopril (PRINIVIL,ZESTRIL) 10 MG tablet Take 1 tablet by mouth Daily. 30 tablet 1   • chlorhexidine (PERIDEX) 0.12 % solution RINSE WITH 1/2 OZ AFTER BRUSHING AND FLOSSING IN THE MORNING AND AT BEDTIME, SPIT OUT, DO NOT DRINK OR EAT FOR 30 MIN AFTER 473 mL 0   • [DISCONTINUED] gabapentin (NEURONTIN) 600 MG tablet Take 600 mg by mouth 3 (three) times a day.     • [DISCONTINUED] HYDROcodone-acetaminophen (NORCO) 5-325 MG per tablet Take 1 tablet by mouth Every 4 to 6 Hours as needed for pain. 8 tablet 0     No current facility-administered medications on file prior to visit.        No Known Allergies    Objective:  Vitals:    06/06/18 1544   BP: 147/88   Pulse: 58   SpO2: 99%     Comfortable NAD  PERRL, conjunctiva clear  Neck supple, no JVD or thyromegaly appreciated  S1/S2 RRR, no  m/r/g  Lungs CTA B, normal effort  Abdomen S/NT/ND (+) BS, no HSM appreciated  Extremities warm, no clubbing, cyanosis, or edema  Normal gait  No visible or palpable skin lesions  A/Ox4, mood and affect appropriate  Pulse exam: Hola's:    DATA:       Results for orders placed during the hospital encounter of 06/02/17   Adult Transthoracic Echo Complete    Narrative · Left atrial cavity size is borderline dilated.  · Left ventricular systolic function is normal. Estimated EF = 50%.  · There are no hemodynamically significant valvular lesions noted                    Results for orders placed during the hospital encounter of 03/05/16   Cardiac catheterization    Narrative [place], [place] [address]  [phone]    NAME:                    CAMPBELL, CLAUDE  ROOM NUMBER:             [id number]  MEDICAL RECORD NUMBER:   [id number]  VISIT NUMBER:            [id number]  PHYSICIAN:                    Cameron Villa D.O.*  PRIMARY CARE PROVIDER:   Raul Green MD  DATE OF PROCEDURE:       03/05/2016    YOB: 1965    PROCEDURE PERFORMED:  1.  Emergent left heart catheterization.   2.  Coronary angiography.   3.  Primary stenting of the right coronary artery.     INDICATION FOR PROCEDURE: Acute inferolateral myocardial infarction.     DESCRIPTION OF PROCEDURE: After obtaining informed consent, the patient was  taken emergently to the cardiac catheterization lab where he was prepped in  normal sterile fashion. The area over the right femoral artery was anesthetized  with the use of 1% lidocaine. A #6 Moldovan introducer was placed in the right  femoral artery without difficulty. A #5 Moldovan JL-4 diagnostic catheter was  then advanced over the wire into the ascending aorta and selectively placed in  the ostium and the left main coronary artery. Views were obtained in standard  projections. This catheter was then removed from the body. A #6 Moldovan JR-4  guiding  catheter was then advanced over the wire into the ascending aorta,  through the aortic valve and into the left ventricle. Pressures were obtained.  A pullback was performed. This catheter was then selectively placed in the  ostium of the right coronary artery. Angiography demonstrated total occlusion  of the vessel in its midportion. A 0.014 All-Star wire was then advanced out  through the lesion, reestablishing distal SWATHI grade 3 flow. An Alpine 3.0 x 15  mm stent was then advanced out into the area of the lesion and deployed at 16  atmospheres for 30 seconds. The stent deployment device was drawn back,  angiography was repeated and there was a spasm of a small branch noted, and in  a distal posterior lateral there was blunting, consistent with distal  embolization of a small thrombus. Nitroglycerin was instilled and Integrilin  was initiated. The guiding catheter was removed from the body. The diagnostic  left catheter was then returned to the ostium of the left main coronary artery  and the left coronary  images were completed. This catheter was then removed  from the body. The guiding catheter was then advanced back into the ascending  aorta and placed in the ostium of the right coronary artery, repeat angiography  demonstrated patency of the previously spasmed branch artery and continued  blunting of the distal posterolateral artery. The right femoral artery was  visualized angiographically. The sheath was sewn in place and the patient was  taken to the intensive care unit in critical, but stable condition.     FINDINGS:  HEMODYNAMICS:  Left ventricular pressure was 119/16 with an ascending aortic  pressure of 115/75 and a mean of 94. No significant gradient was appreciated  across the aortic valve.     LEFT VENTRICULOGRAM:  Not performed.     CORONARY ANGIOGRAPHY:    LEFT MAIN: The left main is a moderate sized vessel, which gives rise to a  moderate size left anterior descending and moderate size left  circumflex. There  are luminal irregularities in the left coronary system, but no significant  stenosis is appreciated.     RIGHT CORONARY ARTERY: The right coronary artery is a large dominant vessel in  that it gives rise to the posterior descending artery. It also gives rise to a  large trifurcating posterolateral artery. The midportion of the right coronary  artery is totally occluded with SWATHI grade 0 flow. Post dilatation there is a  less than 10% stenosis with SWATHI grade 3 flow reestablished.     COMPLICATIONS: None.     INTERVENTION PERFORMED:  Primary stenting of the right coronary artery.     SUMMARY:  1.  Severe coronary artery disease involving a totally occluded large dominant  right coronary artery.  2.  Successful percutaneous stent placement to the right coronary artery.          D:   ZO 03/05/2016 09:17:26  T:   eve 03/05/2016 11:44:17  JOB ID:[id number]  [id number]  Revision Count:     0  cc:       Raul Green M.D.                                 Signature:__________________________                                     Basia Villa D.O.*  DO NOT TEXT EDIT THIS LINE :RCC:[id number]:  Authenticated by BASIA VILLA MD On 03/05/2016 01:21:26 PM            A/P:    CAD: Asymptomatic. Cont med Rx    ICMP: mild . Well compensated.    HTN: increase lisinopril to 20mg. Check BMP    XOL: Check FLP, ALT, CPK.    F/u 6 months.    QUIT smoking.    Thank you for allowing me to participate in the care of Claude E Campbell. Feel free to contact me directly with any further questions or concerns.

## 2019-01-14 ENCOUNTER — TELEPHONE (OUTPATIENT)
Dept: CARDIOLOGY | Facility: CLINIC | Age: 54
End: 2019-01-14

## 2019-01-28 ENCOUNTER — OFFICE VISIT (OUTPATIENT)
Dept: CARDIOLOGY | Facility: CLINIC | Age: 54
End: 2019-01-28

## 2019-01-28 VITALS
HEIGHT: 73 IN | SYSTOLIC BLOOD PRESSURE: 137 MMHG | WEIGHT: 192.2 LBS | HEART RATE: 66 BPM | OXYGEN SATURATION: 93 % | BODY MASS INDEX: 25.47 KG/M2 | DIASTOLIC BLOOD PRESSURE: 90 MMHG

## 2019-01-28 DIAGNOSIS — I25.10 CORONARY ARTERY DISEASE INVOLVING NATIVE CORONARY ARTERY OF NATIVE HEART WITHOUT ANGINA PECTORIS: Primary | ICD-10-CM

## 2019-01-28 DIAGNOSIS — Z72.0 TOBACCO ABUSE: ICD-10-CM

## 2019-01-28 DIAGNOSIS — E78.2 MIXED HYPERLIPIDEMIA: ICD-10-CM

## 2019-01-28 DIAGNOSIS — I10 ESSENTIAL HYPERTENSION: ICD-10-CM

## 2019-01-28 PROCEDURE — 99213 OFFICE O/P EST LOW 20 MIN: CPT | Performed by: NURSE PRACTITIONER

## 2019-01-28 RX ORDER — CARVEDILOL 3.12 MG/1
3.12 TABLET ORAL 2 TIMES DAILY WITH MEALS
Qty: 60 TABLET | Refills: 11 | Status: SHIPPED | OUTPATIENT
Start: 2019-01-28 | End: 2019-07-29 | Stop reason: SDUPTHER

## 2019-01-28 RX ORDER — ATORVASTATIN CALCIUM 80 MG/1
80 TABLET, FILM COATED ORAL DAILY
Qty: 30 TABLET | Refills: 11 | Status: SHIPPED | OUTPATIENT
Start: 2019-01-28 | End: 2019-07-29 | Stop reason: SDUPTHER

## 2019-01-28 RX ORDER — ASPIRIN 81 MG/1
81 TABLET ORAL DAILY
Qty: 30 TABLET | Refills: 5 | Status: SHIPPED | OUTPATIENT
Start: 2019-01-28 | End: 2019-02-27

## 2019-01-28 RX ORDER — CLOPIDOGREL BISULFATE 75 MG/1
75 TABLET ORAL DAILY
Qty: 30 TABLET | Refills: 11 | Status: SHIPPED | OUTPATIENT
Start: 2019-01-28 | End: 2019-07-29 | Stop reason: SDUPTHER

## 2019-01-28 RX ORDER — LISINOPRIL 20 MG/1
20 TABLET ORAL DAILY
Qty: 30 TABLET | Refills: 11 | Status: SHIPPED | OUTPATIENT
Start: 2019-01-28 | End: 2019-07-29 | Stop reason: SDUPTHER

## 2019-01-28 NOTE — PROGRESS NOTES
Subjective     Chief Complaint: Coronary Artery Disease    History of Present Illness   Claude E Campbell is a 53 y.o. male who presents to past medical history significant for ASCVD, status post AMI on 3/5/2016 with PCI ABNER to the RCA, hypertension, dyslipidemia, and tobacco use.  He presents today for his regular follow-up.    He denies complaint of chest pain, palpitations and shortness of air.  He does report that he bleeds very easily.  He denies bright red blood per rectum or black tarry stools.      Current Outpatient Medications:   •  atorvastatin (LIPITOR) 80 MG tablet, Take 1 tablet by mouth Daily., Disp: 30 tablet, Rfl: 11  •  carvedilol (COREG) 3.125 MG tablet, Take 1 tablet by mouth 2 (Two) Times a Day With Meals., Disp: 60 tablet, Rfl: 11  •  clopidogrel (PLAVIX) 75 MG tablet, Take 1 tablet by mouth Daily., Disp: 30 tablet, Rfl: 11  •  lisinopril (PRINIVIL,ZESTRIL) 20 MG tablet, Take 1 tablet by mouth Daily., Disp: 30 tablet, Rfl: 11  •  aspirin 81 MG EC tablet, Take 1 tablet by mouth Daily for 30 days., Disp: 30 tablet, Rfl: 5  •  chlorhexidine (PERIDEX) 0.12 % solution, RINSE WITH 1/2 OZ AFTER BRUSHING AND FLOSSING IN THE MORNING AND AT BEDTIME, SPIT OUT, DO NOT DRINK OR EAT FOR 30 MIN AFTER, Disp: 473 mL, Rfl: 0     The following portions of the patient's history were reviewed and updated as appropriate: allergies, current medications, past family history, past medical history, past social history, past surgical history and problem list.    Review of Systems   Constitution: Negative for weakness and malaise/fatigue.   Cardiovascular: Negative for chest pain, dyspnea on exertion, irregular heartbeat, leg swelling, near-syncope, orthopnea, palpitations, paroxysmal nocturnal dyspnea and syncope.   Respiratory: Negative for shortness of breath.    Hematologic/Lymphatic: Bruises/bleeds easily (bleeds easily.).   Neurological: Negative for dizziness and light-headedness.         Objective     /90  "(BP Location: Left arm, Patient Position: Sitting)   Pulse 66   Ht 185.4 cm (73\")   Wt 87.2 kg (192 lb 3.2 oz)   SpO2 93%   BMI 25.36 kg/m²     Physical Exam   Constitutional: He appears well-developed and well-nourished.   HENT:   Head: Normocephalic and atraumatic.   Eyes: Pupils are equal, round, and reactive to light.   Neck: No JVD present.   Cardiovascular: Normal rate, regular rhythm and intact distal pulses. Exam reveals no gallop and no friction rub.   No murmur heard.  No lower extremity swelling   Pulmonary/Chest: Effort normal and breath sounds normal. No respiratory distress. He has no wheezes. He has no rales.   Skin: Skin is warm and dry.   Psychiatric: He has a normal mood and affect.   Vitals reviewed.      Procedures      Assessment/Plan       There are no diagnoses linked to this encounter.      Assessment:  1. ASCVD, status post acute myocardial infarction on 3/5/2016 with PCI ABNER to the RCA  2. Essential hypertension  3. Dyslipidemia  4. Tobacco use      Plan:  1. Continue current medications including Plavix, lisinopril, Coreg, and atorvastatin.  Aspirin decreased to 81 mg daily.  2. Risk factor modification: Patient was counseled on smoking cessation at today's visit.  I spent less than 3 minutes talking to him about the dangers of continued tobacco use.  Educational materials were given.  3. Patient to have labs drawn this Saturday.  4. Return to clinic in 6 months, sooner for any worsening or concerning symptoms.      Return in about 6 months (around 7/28/2019).          VISHAL Landeros  "

## 2019-01-28 NOTE — PATIENT INSTRUCTIONS
Cholesterol  Cholesterol is a fat. Your body needs a small amount of cholesterol. Cholesterol (plaque) may build up in your blood vessels (arteries). That makes you more likely to have a heart attack or stroke.  You cannot feel your cholesterol level. Having a blood test is the only way to find out if your level is high. Keep your test results. Work with your doctor to keep your cholesterol at a good level.  What do the results mean?  · Total cholesterol is how much cholesterol is in your blood.  · LDL is bad cholesterol. This is the type that can build up. Try to have low LDL.  · HDL is good cholesterol. It cleans your blood vessels and carries LDL away. Try to have high HDL.  · Triglycerides are fat that the body can store or burn for energy.  What are good levels of cholesterol?  · Total cholesterol below 200.  · LDL below 100 is good for people who have health risks. LDL below 70 is good for people who have very high risks.  · HDL above 40 is good. It is best to have HDL of 60 or higher.  · Triglycerides below 150.  How can I lower my cholesterol?  Diet  Follow your diet program as told by your doctor.  · Choose fish, white meat chicken, or turkey that is roasted or baked. Try not to eat red meat, fried foods, sausage, or lunch meats.  · Eat lots of fresh fruits and vegetables.  · Choose whole grains, beans, pasta, potatoes, and cereals.  · Choose olive oil, corn oil, or canola oil. Only use small amounts.  · Try not to eat butter, mayonnaise, shortening, or palm kernel oils.  · Try not to eat foods with trans fats.  · Choose low-fat or nonfat dairy foods.  ? Drink skim or nonfat milk.  ? Eat low-fat or nonfat yogurt and cheeses.  ? Try not to drink whole milk or cream.  ? Try not to eat ice cream, egg yolks, or full-fat cheeses.  · Healthy desserts include yifan food cake, lópez snaps, animal crackers, hard candy, popsicles, and low-fat or nonfat frozen yogurt. Try not to eat pastries, cakes, pies, and  cookies.    Exercise  Follow your exercise program as told by your doctor.  · Be more active. Try gardening, walking, and taking the stairs.  · Ask your doctor about ways that you can be more active.    Medicine  · Take over-the-counter and prescription medicines only as told by your doctor.  This information is not intended to replace advice given to you by your health care provider. Make sure you discuss any questions you have with your health care provider.  Document Released: 03/16/2010 Document Revised: 07/19/2017 Document Reviewed: 06/29/2017  [employer] Interactive Patient Education © 2018 [employer]    Smoking Tobacco Information  Smoking tobacco will very likely harm your health. Tobacco contains a poisonous (toxic), colorless chemical called nicotine. Nicotine affects the brain and makes tobacco addictive. This change in your brain can make it hard to stop smoking. Tobacco also has other toxic chemicals that can hurt your body and raise your risk of many cancers.  How can smoking tobacco affect me?  Smoking tobacco can increase your chances of having serious health conditions, such as:  · Cancer. Smoking is most commonly associated with lung cancer, but can lead to cancer in other parts of the body.  · Chronic obstructive pulmonary disease (COPD). This is a long-term lung condition that makes it hard to breathe. It also gets worse over time.  · High blood pressure (hypertension), heart disease, stroke, or heart attack.  · Lung infections, such as pneumonia.  · Cataracts. This is when the lenses in the eyes become clouded.  · Digestive problems. This may include peptic ulcers, heartburn, and gastroesophageal reflux disease (GERD).  · Oral health problems, such as gum disease and tooth loss.  · Loss of taste and smell.    Smoking can affect your appearance by causing:  · Wrinkles.  · Yellow or stained teeth, fingers, and fingernails.    Smoking tobacco can also affect your social life.  · Many workplaces,  restaurants, hotels, and public places are tobacco-free. This means that you may experience challenges in finding places to smoke when away from home.  · The cost of a smoking habit can be expensive. Expenses for someone who smokes come in two ways:  ? You spend money on a regular basis to buy tobacco.  ? Your health care costs in the long-term are higher if you smoke.  · Tobacco smoke can also affect the health of those around you. Children of smokers have greater chances of:  ? Sudden infant death syndrome (SIDS).  ? Ear infections.  ? Lung infections.    What lifestyle changes can be made?  · Do not start smoking. Quit if you already do.  · To quit smoking:  ? Make a plan to quit smoking and commit yourself to it. Look for programs to help you and ask your health care provider for recommendations and ideas.  ? Talk with your health care provider about using nicotine replacement medicines to help you quit. Medicine replacement medicines include gum, lozenges, patches, sprays, or pills.  ? Do not replace cigarette smoking with electronic cigarettes, which are commonly called e-cigarettes. The safety of e-cigarettes is not known, and some may contain harmful chemicals.  ? Avoid places, people, or situations that tempt you to smoke.  ? If you try to quit but return to smoking, don't give up hope. It is very common for people to try a number of times before they fully succeed. When you feel ready again, give it another try.  · Quitting smoking might affect the way you eat as well as your weight. Be prepared to monitor your eating habits. Get support in planning and following a healthy diet.  · Ask your health care provider about having regular tests (screenings) to check for cancer. This may include blood tests, imaging tests, and other tests.  · Exercise regularly. Consider taking walks, joining a gym, or doing yoga or exercise classes.  · Develop skills to manage your stress. These skills include meditation.  What  are the benefits of quitting smoking?  By quitting smoking, you may:  · Lower your risk of getting cancer and other diseases caused by smoking.  · Live longer.  · Breathe better.  · Lower your blood pressure and heart rate.  · Stop your addiction to tobacco.  · Stop creating secondhand smoke that hurts other people.  · Improve your sense of taste and smell.  · Look better over time, due to having fewer wrinkles and less staining.    What can happen if changes are not made?  If you do not stop smoking, you may:  · Get cancer and other diseases.  · Develop COPD or other long-term (chronic) lung conditions.  · Develop serious problems with your heart and blood vessels (cardiovascular system).  · Need more tests to screen for problems caused by smoking.  · Have higher, long-term healthcare costs from medicines or treatments related to smoking.  · Continue to have worsening changes in your lungs, mouth, and nose.    Where to find support:  To get support to quit smoking, consider:  · Asking your health care provider for more information and resources.  · Taking classes to learn more about quitting smoking.  · Looking for local organizations that offer resources about quitting smoking.  · Joining a support group for people who want to quit smoking in your local community.    Where to find more information:  You may find more information about quitting smoking from:  · HelpGuide.org: www.helpguide.org/articles/addictions/how-to-quit-smoking.htm  · Smokefree.gov: smokefree.gov  · American Lung Association: www.lung.org    Contact a health care provider if:  · You have problems breathing.  · Your lips, nose, or fingers turn blue.  · You have chest pain.  · You are coughing up blood.  · You feel faint or you pass out.  · You have other noticeable changes that cause you to worry.  Summary  · Smoking tobacco can negatively affect your health, the health of those around you, your finances, and your social life.  · Do not start  smoking. Quit if you already do. If you need help quitting, ask your health care provider.  · Think about joining a support group for people who want to quit smoking in your local community. There are many effective programs that will help you to quit this behavior.  This information is not intended to replace advice given to you by your health care provider. Make sure you discuss any questions you have with your health care provider.  Document Released: 01/02/2018 Document Revised: 01/02/2018 Document Reviewed: 01/02/2018  [employer] Interactive Patient Education © 2018 [employer].

## 2019-02-03 ENCOUNTER — LAB (OUTPATIENT)
Dept: LAB | Facility: HOSPITAL | Age: 54
End: 2019-02-03
Attending: INTERNAL MEDICINE

## 2019-02-03 DIAGNOSIS — I10 ESSENTIAL HYPERTENSION: ICD-10-CM

## 2019-02-03 DIAGNOSIS — E78.2 MIXED HYPERLIPIDEMIA: ICD-10-CM

## 2019-02-03 DIAGNOSIS — I25.10 CORONARY ARTERY DISEASE INVOLVING NATIVE CORONARY ARTERY OF NATIVE HEART WITHOUT ANGINA PECTORIS: ICD-10-CM

## 2019-02-03 DIAGNOSIS — I25.2 OLD MI (MYOCARDIAL INFARCTION): ICD-10-CM

## 2019-02-03 LAB
ALT SERPL W P-5'-P-CCNC: 29 U/L (ref 10–44)
ANION GAP SERPL CALCULATED.3IONS-SCNC: 3.3 MMOL/L (ref 3.6–11.2)
BUN BLD-MCNC: 15 MG/DL (ref 7–21)
BUN/CREAT SERPL: 22.1 (ref 7–25)
CALCIUM SPEC-SCNC: 8.9 MG/DL (ref 7.7–10)
CHLORIDE SERPL-SCNC: 107 MMOL/L (ref 99–112)
CHOLEST SERPL-MCNC: 114 MG/DL (ref 0–200)
CK MB SERPL-RTO: 4 % (ref 0–3)
CO2 SERPL-SCNC: 25.7 MMOL/L (ref 24.3–31.9)
CREAT BLD-MCNC: 0.68 MG/DL (ref 0.43–1.29)
GFR SERPL CREATININE-BSD FRML MDRD: 122 ML/MIN/1.73
GLUCOSE BLD-MCNC: 110 MG/DL (ref 70–110)
HDLC SERPL-MCNC: 43 MG/DL (ref 60–100)
LDLC SERPL CALC-MCNC: 38 MG/DL (ref 0–100)
LDLC/HDLC SERPL: 0.88 {RATIO}
OSMOLALITY SERPL CALC.SUM OF ELEC: 273.4 MOSM/KG (ref 273–305)
POTASSIUM BLD-SCNC: 4.2 MMOL/L (ref 3.5–5.3)
SODIUM BLD-SCNC: 136 MMOL/L (ref 135–153)
TRIGL SERPL-MCNC: 166 MG/DL (ref 0–150)
VLDLC SERPL-MCNC: 33.2 MG/DL

## 2019-02-03 PROCEDURE — 80048 BASIC METABOLIC PNL TOTAL CA: CPT

## 2019-02-03 PROCEDURE — 82550 ASSAY OF CK (CPK): CPT

## 2019-02-03 PROCEDURE — 80061 LIPID PANEL: CPT

## 2019-02-03 PROCEDURE — 82553 CREATINE MB FRACTION: CPT

## 2019-02-03 PROCEDURE — 36415 COLL VENOUS BLD VENIPUNCTURE: CPT

## 2019-02-03 PROCEDURE — 84460 ALANINE AMINO (ALT) (SGPT): CPT

## 2019-02-04 LAB
CK MB SERPL-CCNC: 5.17 NG/ML (ref 0–5)
CK SERPL-CCNC: 128 U/L (ref 24–204)

## 2019-07-29 RX ORDER — CARVEDILOL 3.12 MG/1
3.12 TABLET ORAL 2 TIMES DAILY WITH MEALS
Qty: 60 TABLET | Refills: 5 | Status: SHIPPED | OUTPATIENT
Start: 2019-07-29 | End: 2020-07-01 | Stop reason: SDUPTHER

## 2019-07-29 RX ORDER — ATORVASTATIN CALCIUM 80 MG/1
80 TABLET, FILM COATED ORAL DAILY
Qty: 30 TABLET | Refills: 5 | Status: SHIPPED | OUTPATIENT
Start: 2019-07-29 | End: 2020-07-01 | Stop reason: SDUPTHER

## 2019-07-29 RX ORDER — CLOPIDOGREL BISULFATE 75 MG/1
75 TABLET ORAL DAILY
Qty: 30 TABLET | Refills: 11 | Status: SHIPPED | OUTPATIENT
Start: 2019-07-29 | End: 2020-07-01 | Stop reason: SDUPTHER

## 2019-07-29 RX ORDER — LISINOPRIL 20 MG/1
20 TABLET ORAL DAILY
Qty: 30 TABLET | Refills: 5 | Status: SHIPPED | OUTPATIENT
Start: 2019-07-29 | End: 2020-07-01 | Stop reason: SDUPTHER

## 2019-07-29 NOTE — TELEPHONE ENCOUNTER
Patient's wife called asking if Claude could be seen on Thursday since he is taking off that day anyway for her procedure so he could avoid having to take off 2 days in a row. I explained that Opal doesn't have a schedule opened up for Thursday but since it was a 6 month follow up if he was doing okay we could push the appointment on out. She states he is doing just fine however, this appointment was mainly for refills on medication. I told her I could go ahead and push the appointment on out for September and so long as he had an appointment on the books I could send in enough medication to get him through the follow up appointment. She is very agreeable to this.

## 2019-09-18 ENCOUNTER — APPOINTMENT (OUTPATIENT)
Dept: LAB | Facility: HOSPITAL | Age: 54
End: 2019-09-18

## 2019-09-18 ENCOUNTER — OFFICE VISIT (OUTPATIENT)
Dept: CARDIOLOGY | Facility: CLINIC | Age: 54
End: 2019-09-18

## 2019-09-18 VITALS
OXYGEN SATURATION: 96 % | BODY MASS INDEX: 25.73 KG/M2 | DIASTOLIC BLOOD PRESSURE: 85 MMHG | HEART RATE: 74 BPM | HEIGHT: 72 IN | WEIGHT: 190 LBS | SYSTOLIC BLOOD PRESSURE: 128 MMHG

## 2019-09-18 DIAGNOSIS — E78.2 MIXED HYPERLIPIDEMIA: Primary | ICD-10-CM

## 2019-09-18 DIAGNOSIS — I10 ESSENTIAL HYPERTENSION: ICD-10-CM

## 2019-09-18 DIAGNOSIS — I25.10 CORONARY ARTERY DISEASE INVOLVING NATIVE CORONARY ARTERY OF NATIVE HEART WITHOUT ANGINA PECTORIS: ICD-10-CM

## 2019-09-18 LAB
ALBUMIN SERPL-MCNC: 4.42 G/DL (ref 3.5–5.2)
ALBUMIN/GLOB SERPL: 1.2 G/DL
ALP SERPL-CCNC: 59 U/L (ref 39–117)
ALT SERPL W P-5'-P-CCNC: 35 U/L (ref 1–41)
ANION GAP SERPL CALCULATED.3IONS-SCNC: 11.1 MMOL/L (ref 5–15)
AST SERPL-CCNC: 31 U/L (ref 1–40)
BILIRUB SERPL-MCNC: 0.2 MG/DL (ref 0.2–1.2)
BUN BLD-MCNC: 19 MG/DL (ref 6–20)
BUN/CREAT SERPL: 24.7 (ref 7–25)
CALCIUM SPEC-SCNC: 9.4 MG/DL (ref 8.6–10.5)
CHLORIDE SERPL-SCNC: 101 MMOL/L (ref 98–107)
CHOLEST SERPL-MCNC: 147 MG/DL (ref 0–200)
CO2 SERPL-SCNC: 24.9 MMOL/L (ref 22–29)
CREAT BLD-MCNC: 0.77 MG/DL (ref 0.76–1.27)
DEPRECATED RDW RBC AUTO: 45.2 FL (ref 37–54)
ERYTHROCYTE [DISTWIDTH] IN BLOOD BY AUTOMATED COUNT: 13.2 % (ref 12.3–15.4)
GFR SERPL CREATININE-BSD FRML MDRD: 105 ML/MIN/1.73
GLOBULIN UR ELPH-MCNC: 3.6 GM/DL
GLUCOSE BLD-MCNC: 124 MG/DL (ref 65–99)
HCT VFR BLD AUTO: 41.2 % (ref 37.5–51)
HDLC SERPL-MCNC: 43 MG/DL (ref 40–60)
HGB BLD-MCNC: 14 G/DL (ref 13–17.7)
LDLC SERPL CALC-MCNC: 56 MG/DL (ref 0–100)
LDLC/HDLC SERPL: 1.3 {RATIO}
MCH RBC QN AUTO: 32 PG (ref 26.6–33)
MCHC RBC AUTO-ENTMCNC: 34 G/DL (ref 31.5–35.7)
MCV RBC AUTO: 94.3 FL (ref 79–97)
PLATELET # BLD AUTO: 246 10*3/MM3 (ref 140–450)
PMV BLD AUTO: 10 FL (ref 6–12)
POTASSIUM BLD-SCNC: 4.4 MMOL/L (ref 3.5–5.2)
PROT SERPL-MCNC: 8 G/DL (ref 6–8.5)
RBC # BLD AUTO: 4.37 10*6/MM3 (ref 4.14–5.8)
SODIUM BLD-SCNC: 137 MMOL/L (ref 136–145)
TRIGL SERPL-MCNC: 240 MG/DL (ref 0–150)
VLDLC SERPL-MCNC: 48 MG/DL
WBC NRBC COR # BLD: 10.34 10*3/MM3 (ref 3.4–10.8)

## 2019-09-18 PROCEDURE — 99214 OFFICE O/P EST MOD 30 MIN: CPT | Performed by: NURSE PRACTITIONER

## 2019-09-18 PROCEDURE — 80053 COMPREHEN METABOLIC PANEL: CPT | Performed by: NURSE PRACTITIONER

## 2019-09-18 PROCEDURE — 36415 COLL VENOUS BLD VENIPUNCTURE: CPT | Performed by: NURSE PRACTITIONER

## 2019-09-18 PROCEDURE — 80061 LIPID PANEL: CPT | Performed by: NURSE PRACTITIONER

## 2019-09-18 PROCEDURE — 85027 COMPLETE CBC AUTOMATED: CPT | Performed by: NURSE PRACTITIONER

## 2019-09-18 RX ORDER — FENOFIBRATE 48 MG/1
48 TABLET, COATED ORAL DAILY
Qty: 30 TABLET | Refills: 11 | Status: SHIPPED | OUTPATIENT
Start: 2019-09-18 | End: 2020-07-01 | Stop reason: SDUPTHER

## 2019-09-18 RX ORDER — ASPIRIN 81 MG/1
81 TABLET, COATED ORAL DAILY
Refills: 5 | Status: ON HOLD | COMMUNITY
Start: 2019-07-30 | End: 2020-06-23

## 2019-09-18 NOTE — PROGRESS NOTES
Subjective     Chief Complaint: Coronary artery disease involving native coronary artery of ; Hypertension; and dyslipidemia    History of Present Illness   Claude E Campbell is a 54 y.o. male who presents with a past medical history significant for ASCVD, status post AMI on 3/5/2016 with PCI ABNER to the RCA, hypertension, dyslipidemia, and tobacco use.  He presents today for his regular follow-up.    Mr. Clement is accompanied by his sister for today's exam.    Mr. Clement denies chest pain, palpitations and lower extremity swelling.  He does report some dyspnea on exertion.  He states that he bruises easily, however denies bright red blood per rectum or black tarry stools.      Current Outpatient Medications:   •  atorvastatin (LIPITOR) 80 MG tablet, Take 1 tablet by mouth Daily., Disp: 30 tablet, Rfl: 5  •  carvedilol (COREG) 3.125 MG tablet, Take 1 tablet by mouth 2 (Two) Times a Day With Meals., Disp: 60 tablet, Rfl: 5  •  chlorhexidine (PERIDEX) 0.12 % solution, RINSE WITH 1/2 OZ AFTER BRUSHING AND FLOSSING IN THE MORNING AND AT BEDTIME, SPIT OUT, DO NOT DRINK OR EAT FOR 30 MIN AFTER, Disp: 473 mL, Rfl: 0  •  clopidogrel (PLAVIX) 75 MG tablet, Take 1 tablet by mouth Daily., Disp: 30 tablet, Rfl: 11  •  lisinopril (PRINIVIL,ZESTRIL) 20 MG tablet, Take 1 tablet by mouth Daily., Disp: 30 tablet, Rfl: 5  •  ASPIRIN LOW DOSE 81 MG EC tablet, Take 81 mg by mouth Daily. For the heart, Disp: , Rfl: 5     On this date:  The following portions of the patient's history were reviewed and updated as appropriate: allergies, current medications, past family history, past medical history, past social history, past surgical history and problem list.    Review of Systems   Constitution: Negative for weakness and malaise/fatigue.   Cardiovascular: Positive for dyspnea on exertion. Negative for chest pain, irregular heartbeat, leg swelling, near-syncope, orthopnea, palpitations, paroxysmal nocturnal dyspnea and syncope.  "  Respiratory: Negative for shortness of breath.    Hematologic/Lymphatic: Bruises/bleeds easily.   Neurological: Negative for dizziness and light-headedness.         Objective     /85 (BP Location: Left arm, Patient Position: Sitting, Cuff Size: Adult)   Pulse 74   Ht 182.9 cm (72\")   Wt 86.2 kg (190 lb)   SpO2 96%   BMI 25.77 kg/m²     Physical Exam   Constitutional: He is oriented to person, place, and time. He appears well-developed and well-nourished.   HENT:   Head: Normocephalic and atraumatic.   Eyes: Pupils are equal, round, and reactive to light.   Neck: No JVD present.   Cardiovascular: Normal rate, regular rhythm and intact distal pulses. Exam reveals no gallop and no friction rub.   No murmur heard.  Pulmonary/Chest: Effort normal. No respiratory distress. He has no wheezes. He has rhonchi (exp). He has no rales.   Neurological: He is alert and oriented to person, place, and time.   Skin: Skin is warm and dry.   Psychiatric: He has a normal mood and affect.   Vitals reviewed.      Procedures      Assessment/Plan       Claude was seen today for coronary artery disease involving native coronary artery of , hypertension and dyslipidemia.    Diagnoses and all orders for this visit:    Assessment:  1. ASCVD, status post acute myocardial infarction on 3/5/2016 with PCI ABNER to the RCA  2. Essential hypertension  3. Dyslipidemia  4. Tobacco use        Plan:  1. Patient's current plan of care was reviewed and no changes were made.  2. He has not seen his primary care provider in some time.  I have ordered labs today: CBC, CMP and lipid panel.  3. Continue current medications including aspirin, Plavix, lisinopril, Coreg, and atorvastatin.    4. Risk factor modification: Patient was counseled on smoking cessation at today's visit.  I spent less than 3 minutes talking to him about the dangers of continued tobacco use.  Educational materials were given.    5. Return to clinic in 6 months, sooner for any " worsening or concerning symptoms.        Return in about 6 months (around 3/18/2020).      Opal Ho, APRN

## 2019-09-18 NOTE — PATIENT INSTRUCTIONS
Steps to Quit Smoking    Smoking tobacco can be bad for your health. It can also affect almost every organ in your body. Smoking puts you and people around you at risk for many serious long-lasting (chronic) diseases. Quitting smoking is hard, but it is one of the best things that you can do for your health. It is never too late to quit.  What are the benefits of quitting smoking?  When you quit smoking, you lower your risk for getting serious diseases and conditions. They can include:  · Lung cancer or lung disease.  · Heart disease.  · Stroke.  · Heart attack.  · Not being able to have children (infertility).  · Weak bones (osteoporosis) and broken bones (fractures).     If you have coughing, wheezing, and shortness of breath, those symptoms may get better when you quit. You may also get sick less often. If you are pregnant, quitting smoking can help to lower your chances of having a baby of low birth weight.  What can I do to help me quit smoking?  Talk with your doctor about what can help you quit smoking. Some things you can do (strategies) include:  · Quitting smoking totally, instead of slowly cutting back how much you smoke over a period of time.  · Going to in-person counseling. You are more likely to quit if you go to many counseling sessions.  · Using resources and support systems, such as:  ? Online chats with a counselor.  ? Phone quitlines.  ? Printed self-help materials.  ? Support groups or group counseling.  ? Text messaging programs.  ? Mobile phone apps or applications.  · Taking medicines. Some of these medicines may have nicotine in them. If you are pregnant or breastfeeding, do not take any medicines to quit smoking unless your doctor says it is okay. Talk with your doctor about counseling or other things that can help you.     Talk with your doctor about using more than one strategy at the same time, such as taking medicines while you are also going to in-person counseling. This can help make  quitting easier.  What things can I do to make it easier to quit?  Quitting smoking might feel very hard at first, but there is a lot that you can do to make it easier. Take these steps:  · Talk to your family and friends. Ask them to support and encourage you.  · Call phone quitlines, reach out to support groups, or work with a counselor.  · Ask people who smoke to not smoke around you.  · Avoid places that make you want (trigger) to smoke, such as:  ? Bars.  ? Parties.  ? Smoke-break areas at work.  · Spend time with people who do not smoke.  · Lower the stress in your life. Stress can make you want to smoke. Try these things to help your stress:  ? Getting regular exercise.  ? Deep-breathing exercises.  ? Yoga.  ? Meditating.  ? Doing a body scan. To do this, close your eyes, focus on one area of your body at a time from head to toe, and notice which parts of your body are tense. Try to relax the muscles in those areas.  · Download or buy apps on your mobile phone or tablet that can help you stick to your quit plan. There are many free apps, such as QuitGuide from the CDC (Centers for Disease Control and Prevention). You can find more support from smokefree.gov and other websites.     This information is not intended to replace advice given to you by your health care provider. Make sure you discuss any questions you have with your health care provider.  Document Released: 10/14/2010 Document Revised: 08/15/2017 Document Reviewed: 05/03/2016  [employer] Interactive Patient Education © 2019 [employer].

## 2019-09-25 ENCOUNTER — TELEPHONE (OUTPATIENT)
Dept: CARDIOLOGY | Facility: CLINIC | Age: 54
End: 2019-09-25

## 2019-09-25 NOTE — TELEPHONE ENCOUNTER
----- Message from VISHAL Keys sent at 9/18/2019  6:02 PM EDT -----  Please call the patient regarding his abnormal result.  Triglycerides are elevated.  Please start fenofibrate.  Take medication with meals.    I sent script in to pharmacy.    Thanks, Opal

## 2019-09-27 ENCOUNTER — OFFICE VISIT (OUTPATIENT)
Dept: FAMILY MEDICINE CLINIC | Facility: CLINIC | Age: 54
End: 2019-09-27

## 2019-09-27 VITALS
TEMPERATURE: 98.2 F | SYSTOLIC BLOOD PRESSURE: 122 MMHG | OXYGEN SATURATION: 98 % | HEART RATE: 77 BPM | HEIGHT: 72 IN | DIASTOLIC BLOOD PRESSURE: 80 MMHG | BODY MASS INDEX: 25.79 KG/M2 | WEIGHT: 190.4 LBS

## 2019-09-27 DIAGNOSIS — F17.200 CURRENT EVERY DAY SMOKER: ICD-10-CM

## 2019-09-27 DIAGNOSIS — Z76.89 ENCOUNTER TO ESTABLISH CARE: Primary | ICD-10-CM

## 2019-09-27 DIAGNOSIS — K59.09 CHRONIC CONSTIPATION: ICD-10-CM

## 2019-09-27 DIAGNOSIS — Z12.11 COLON CANCER SCREENING: ICD-10-CM

## 2019-09-27 DIAGNOSIS — K64.0 GRADE I HEMORRHOIDS: ICD-10-CM

## 2019-09-27 PROBLEM — Z72.0 TOBACCO ABUSE: Status: RESOLVED | Noted: 2017-05-20 | Resolved: 2019-09-27

## 2019-09-27 PROCEDURE — 99203 OFFICE O/P NEW LOW 30 MIN: CPT | Performed by: FAMILY MEDICINE

## 2019-09-27 RX ORDER — POLYETHYLENE GLYCOL 3350 17 G/17G
17 POWDER, FOR SOLUTION ORAL DAILY
Qty: 100 PACKET | Refills: 2 | Status: ON HOLD | OUTPATIENT
Start: 2019-09-27 | End: 2020-06-23

## 2019-09-27 RX ORDER — DOCUSATE SODIUM 100 MG/1
100 CAPSULE, LIQUID FILLED ORAL 2 TIMES DAILY
Qty: 60 CAPSULE | Refills: 2 | Status: ON HOLD | OUTPATIENT
Start: 2019-09-27 | End: 2020-06-23

## 2019-09-27 NOTE — PROGRESS NOTES
Subjective   Claude E Campbell is a 54 y.o. male.   Pt presents today with CC of Constipation      History of Present Illness   1.  Patient is here to establish care.  #2 patient has a history of coronary artery disease.  He follows regularly with cardiology.  He does not need medication refills today.  #3 patient planes of chronic constipation.  This is been a problem for years and it has been associated with hemorrhoids.  He states that he has a bowel movement approximately every 3 days, and it usually hard and difficult to pass.  He denies bleeding.  He has required bedside hemorrhoid lanced in the ER in the past.  He has never used Preparation H, or any other over-the-counter medication for hemorrhoids.  #4 he requests referral for colonoscopy.  He has never been screened for colon cancer and reports that he has a family history of colon cancer.  Other than constipation, he denies symptoms.         The following portions of the patient's history were reviewed and updated as appropriate: allergies, current medications, past family history, past social history, past surgical history and problem list.    Review of Systems   Constitutional: Negative for chills, fever and unexpected weight loss.   HENT: Negative for congestion and sore throat.    Eyes: Negative for blurred vision and visual disturbance.   Respiratory: Negative for cough and wheezing.    Cardiovascular: Negative for chest pain and palpitations.   Gastrointestinal: Positive for constipation. Negative for abdominal pain, anal bleeding, blood in stool, diarrhea and rectal pain.   Endocrine: Negative for cold intolerance and heat intolerance.   Genitourinary: Negative for dysuria.   Musculoskeletal: Negative for arthralgias and neck stiffness.   Skin: Negative for rash.   Neurological: Negative for dizziness, seizures and syncope.   Psychiatric/Behavioral: Negative for self-injury, suicidal ideas and depressed mood.       Objective   Physical Exam    Constitutional: He is oriented to person, place, and time. He appears well-developed and well-nourished.   HENT:   Head: Normocephalic and atraumatic.   Right Ear: External ear normal.   Left Ear: External ear normal.   Nose: Nose normal.   Mouth/Throat: Oropharynx is clear and moist.   Eyes: Conjunctivae and EOM are normal. Pupils are equal, round, and reactive to light.   Neck: Normal range of motion. Neck supple.   No carotid bruit   Cardiovascular: Normal rate, regular rhythm and normal heart sounds.   Pulmonary/Chest: Effort normal and breath sounds normal.   Abdominal: Soft. Bowel sounds are normal. There is no tenderness.   Genitourinary:   Genitourinary Comments: Deferred exam until his physical.   Lymphadenopathy:     He has no cervical adenopathy.   Neurological: He is alert and oriented to person, place, and time.   Skin: Skin is warm and dry.   Psychiatric: He has a normal mood and affect. His behavior is normal.   Nursing note and vitals reviewed.        Assessment/Plan   Claude was seen today for constipation.    Diagnoses and all orders for this visit:    Encounter to establish care    Chronic constipation  -     docusate sodium (COLACE) 100 MG capsule; Take 1 capsule by mouth 2 (Two) Times a Day.  -     polyethylene glycol (MIRALAX) packet; Take 17 g by mouth Daily.  Recommended docusate sodium twice a day.  Also, he is to take MiraLAX 1 packet daily.  My goal for him is to have 3 bowel movements per day for 1 to 2 weeks before backing off of the MiraLAX to as needed use.  This was discussed with him he was agreeable to this plan.  He is to drink more water, and the day diet higher in fiber.  Grade I hemorrhoids  Prescription for rectal Rockets sent to a local compounding pharmacy.  He is to use these as needed.  Current every day smoker  We discussed smoking cessation for 3 minutes, he is not interested in quitting at this time.  Colon cancer screening  -     Ambulatory Referral For Screening  Colonoscopy                 Claude E Campbell is a current cigarettes user.  He currently smokes 1 pack of cigarettes per day for a duration of 30 years. I have educated him on the risk of diseases from using tobacco products such as cancer, COPD and heart diease.     I advised him to quit and he is not willing to quit.    I spent 3  minutes counseling the patient.        Patient's Body mass index is 25.82 kg/m². BMI is above normal parameters. Recommendations include: exercise counseling and nutrition counseling.   bed rails

## 2019-10-10 ENCOUNTER — OFFICE VISIT (OUTPATIENT)
Dept: SURGERY | Facility: CLINIC | Age: 54
End: 2019-10-10

## 2019-10-10 VITALS
DIASTOLIC BLOOD PRESSURE: 92 MMHG | HEART RATE: 59 BPM | SYSTOLIC BLOOD PRESSURE: 150 MMHG | WEIGHT: 193 LBS | HEIGHT: 72 IN | BODY MASS INDEX: 26.14 KG/M2

## 2019-10-10 DIAGNOSIS — R10.84 GENERALIZED ABDOMINAL PAIN: ICD-10-CM

## 2019-10-10 DIAGNOSIS — K59.09 OTHER CONSTIPATION: Primary | ICD-10-CM

## 2019-10-10 PROCEDURE — 99203 OFFICE O/P NEW LOW 30 MIN: CPT | Performed by: SURGERY

## 2019-10-10 NOTE — PROGRESS NOTES
Subjective   Claude E Campbell is a 54 y.o. male.     Chief Complaint:     History of Present Illness He has had several months of some gassy pains and change in bowel movements. He has not seen gross blood. He has had no prior colonoscopy. He has more difficulty getting his bowels to move. He had not used stool softeners.     The following portions of the patient's history were reviewed and updated as appropriate: current medications, past family history, past medical history, past social history, past surgical history and problem list.    Review of Systems   Constitutional: Negative for activity change, appetite change, chills, fever and unexpected weight change.   HENT: Negative for congestion, facial swelling and sore throat.    Eyes: Negative for photophobia and visual disturbance.   Respiratory: Negative for chest tightness, shortness of breath and wheezing.    Cardiovascular: Negative for chest pain, palpitations and leg swelling.   Gastrointestinal: Negative for abdominal distention, abdominal pain, anal bleeding, blood in stool, constipation, diarrhea, nausea, rectal pain and vomiting.   Endocrine: Negative for cold intolerance, heat intolerance, polydipsia and polyuria.   Genitourinary: Negative for difficulty urinating, dysuria, flank pain and urgency.   Musculoskeletal: Negative for back pain and myalgias.   Skin: Negative for rash and wound.   Allergic/Immunologic: Negative for immunocompromised state.   Neurological: Negative for dizziness, seizures, syncope, light-headedness, numbness and headaches.   Hematological: Negative for adenopathy. Does not bruise/bleed easily.   Psychiatric/Behavioral: Negative for behavioral problems and confusion. The patient is not nervous/anxious.        Objective   Physical Exam   Constitutional: He is oriented to person, place, and time. He appears well-developed and well-nourished. He does not appear ill. No distress.   HENT:   Head: Normocephalic. Head is without  laceration. Hair is normal.   Right Ear: Hearing and ear canal normal.   Left Ear: Hearing and ear canal normal.   Nose: Nose normal. No sinus tenderness. No epistaxis. Right sinus exhibits no maxillary sinus tenderness and no frontal sinus tenderness. Left sinus exhibits no maxillary sinus tenderness and no frontal sinus tenderness.   Eyes: Conjunctivae and lids are normal. Pupils are equal, round, and reactive to light.   Neck: Normal range of motion. No JVD present. No tracheal tenderness present. No tracheal deviation present. No thyroid mass and no thyromegaly present.   Cardiovascular: Normal rate and regular rhythm. Exam reveals no gallop.   No murmur heard.  Pulmonary/Chest: Effort normal and breath sounds normal. No stridor. He has no wheezes. He exhibits no tenderness.   Abdominal: Soft. Bowel sounds are normal. He exhibits no distension, no ascites and no mass. There is no tenderness. There is no rebound and no guarding. No hernia.   Musculoskeletal: He exhibits no edema or deformity.   Lymphadenopathy:     He has no cervical adenopathy.     He has no axillary adenopathy.        Right: No inguinal and no supraclavicular adenopathy present.        Left: No inguinal and no supraclavicular adenopathy present.   Neurological: He is alert and oriented to person, place, and time. He exhibits normal muscle tone.   Skin: Skin is warm, dry and intact. No rash noted. No erythema. No pallor.   Psychiatric: He has a normal mood and affect. His behavior is normal. Thought content normal.   Vitals reviewed.      Assessment/Plan   Claude was seen today for colonoscopy.    Diagnoses and all orders for this visit:    Other constipation    Generalized abdominal pain      colonoscopy

## 2019-10-28 ENCOUNTER — TELEPHONE (OUTPATIENT)
Dept: SURGERY | Facility: CLINIC | Age: 54
End: 2019-10-28

## 2019-10-28 DIAGNOSIS — Z12.11 ENCOUNTER FOR SCREENING COLONOSCOPY: Primary | ICD-10-CM

## 2019-10-28 RX ORDER — SODIUM, POTASSIUM,MAG SULFATES 17.5-3.13G
1 SOLUTION, RECONSTITUTED, ORAL ORAL EVERY 12 HOURS
Qty: 2 BOTTLE | Refills: 0 | Status: SHIPPED | OUTPATIENT
Start: 2019-10-28 | End: 2019-10-29

## 2019-11-01 ENCOUNTER — ANESTHESIA EVENT (OUTPATIENT)
Dept: PERIOP | Facility: HOSPITAL | Age: 54
End: 2019-11-01

## 2019-11-01 ENCOUNTER — ANESTHESIA (OUTPATIENT)
Dept: PERIOP | Facility: HOSPITAL | Age: 54
End: 2019-11-01

## 2019-11-01 ENCOUNTER — HOSPITAL ENCOUNTER (OUTPATIENT)
Facility: HOSPITAL | Age: 54
Setting detail: HOSPITAL OUTPATIENT SURGERY
Discharge: HOME | End: 2019-11-01
Attending: SURGERY | Admitting: SURGERY

## 2019-11-01 VITALS
TEMPERATURE: 97.4 F | DIASTOLIC BLOOD PRESSURE: 62 MMHG | SYSTOLIC BLOOD PRESSURE: 115 MMHG | HEART RATE: 71 BPM | RESPIRATION RATE: 18 BRPM | OXYGEN SATURATION: 97 % | BODY MASS INDEX: 25.73 KG/M2 | WEIGHT: 190 LBS | HEIGHT: 72 IN

## 2019-11-01 PROCEDURE — 25010000002 PROPOFOL 10 MG/ML EMULSION: Performed by: NURSE ANESTHETIST, CERTIFIED REGISTERED

## 2019-11-01 PROCEDURE — 25010000002 FENTANYL CITRATE (PF) 100 MCG/2ML SOLUTION: Performed by: NURSE ANESTHETIST, CERTIFIED REGISTERED

## 2019-11-01 PROCEDURE — 45378 DIAGNOSTIC COLONOSCOPY: CPT | Performed by: SURGERY

## 2019-11-01 PROCEDURE — 25010000002 MIDAZOLAM PER 1 MG: Performed by: NURSE ANESTHETIST, CERTIFIED REGISTERED

## 2019-11-01 RX ORDER — IPRATROPIUM BROMIDE AND ALBUTEROL SULFATE 2.5; .5 MG/3ML; MG/3ML
3 SOLUTION RESPIRATORY (INHALATION) ONCE AS NEEDED
Status: DISCONTINUED | OUTPATIENT
Start: 2019-11-01 | End: 2019-11-01 | Stop reason: HOSPADM

## 2019-11-01 RX ORDER — SODIUM CHLORIDE 0.9 % (FLUSH) 0.9 %
3 SYRINGE (ML) INJECTION EVERY 12 HOURS SCHEDULED
Status: DISCONTINUED | OUTPATIENT
Start: 2019-11-01 | End: 2019-11-01 | Stop reason: HOSPADM

## 2019-11-01 RX ORDER — FENTANYL CITRATE 50 UG/ML
50 INJECTION, SOLUTION INTRAMUSCULAR; INTRAVENOUS
Status: DISCONTINUED | OUTPATIENT
Start: 2019-11-01 | End: 2019-11-01 | Stop reason: HOSPADM

## 2019-11-01 RX ORDER — PROPOFOL 10 MG/ML
VIAL (ML) INTRAVENOUS AS NEEDED
Status: DISCONTINUED | OUTPATIENT
Start: 2019-11-01 | End: 2019-11-01 | Stop reason: SURG

## 2019-11-01 RX ORDER — MIDAZOLAM HYDROCHLORIDE 1 MG/ML
INJECTION INTRAMUSCULAR; INTRAVENOUS AS NEEDED
Status: DISCONTINUED | OUTPATIENT
Start: 2019-11-01 | End: 2019-11-01 | Stop reason: SURG

## 2019-11-01 RX ORDER — ONDANSETRON 2 MG/ML
4 INJECTION INTRAMUSCULAR; INTRAVENOUS AS NEEDED
Status: DISCONTINUED | OUTPATIENT
Start: 2019-11-01 | End: 2019-11-01 | Stop reason: HOSPADM

## 2019-11-01 RX ORDER — LIDOCAINE HYDROCHLORIDE 20 MG/ML
INJECTION, SOLUTION INFILTRATION; PERINEURAL AS NEEDED
Status: DISCONTINUED | OUTPATIENT
Start: 2019-11-01 | End: 2019-11-01 | Stop reason: SURG

## 2019-11-01 RX ORDER — SODIUM CHLORIDE 0.9 % (FLUSH) 0.9 %
3-10 SYRINGE (ML) INJECTION AS NEEDED
Status: DISCONTINUED | OUTPATIENT
Start: 2019-11-01 | End: 2019-11-01 | Stop reason: HOSPADM

## 2019-11-01 RX ORDER — FENTANYL CITRATE 50 UG/ML
INJECTION, SOLUTION INTRAMUSCULAR; INTRAVENOUS AS NEEDED
Status: DISCONTINUED | OUTPATIENT
Start: 2019-11-01 | End: 2019-11-01 | Stop reason: SURG

## 2019-11-01 RX ORDER — SODIUM CHLORIDE, SODIUM LACTATE, POTASSIUM CHLORIDE, CALCIUM CHLORIDE 600; 310; 30; 20 MG/100ML; MG/100ML; MG/100ML; MG/100ML
125 INJECTION, SOLUTION INTRAVENOUS CONTINUOUS
Status: DISCONTINUED | OUTPATIENT
Start: 2019-11-01 | End: 2019-11-01 | Stop reason: HOSPADM

## 2019-11-01 RX ADMIN — PROPOFOL 80 MG: 10 INJECTION, EMULSION INTRAVENOUS at 07:26

## 2019-11-01 RX ADMIN — PROPOFOL 100 MCG/KG/MIN: 10 INJECTION, EMULSION INTRAVENOUS at 07:26

## 2019-11-01 RX ADMIN — SODIUM CHLORIDE, POTASSIUM CHLORIDE, SODIUM LACTATE AND CALCIUM CHLORIDE: 600; 310; 30; 20 INJECTION, SOLUTION INTRAVENOUS at 07:23

## 2019-11-01 RX ADMIN — MIDAZOLAM HYDROCHLORIDE 2 MG: 1 INJECTION, SOLUTION INTRAMUSCULAR; INTRAVENOUS at 07:23

## 2019-11-01 RX ADMIN — LIDOCAINE HYDROCHLORIDE 80 MG: 20 INJECTION, SOLUTION INFILTRATION; PERINEURAL at 07:23

## 2019-11-01 RX ADMIN — PROPOFOL 40 MG: 10 INJECTION, EMULSION INTRAVENOUS at 07:30

## 2019-11-01 RX ADMIN — FENTANYL CITRATE 100 MCG: 50 INJECTION INTRAMUSCULAR; INTRAVENOUS at 07:23

## 2019-11-01 NOTE — OP NOTE
COLONOSCOPY  Procedure Note    Claude E Campbell  11/1/2019    Pre-op Diagnosis:   Other constipation [K59.09]  Generalized abdominal pain [R10.84]    Post-op Diagnosis:  Normal colon       Procedure(s):  COLONOSCOPY    Surgeon(s):  Bruno Lunsford MD    Anesthesia: General    Staff:   Circulator: Kathy Willis RN  Endo Technician: Cameron Garcia    Estimated Blood Loss: none    Specimens:                * No orders in the log *      Drains:      Procedure: The scope went easily to the cecum. The ileocecal valve was normal. The prep was good and no polyps or inflammation seen throughout the colon. He has some mild hemorrrhoids and a few small diverticuli that were not inflamed.    Findings:  normal    Complications:  none   Grafts / Implants N/A    Bruno Lunsford MD     Date: 11/1/2019  Time: 7:53 AM

## 2019-11-01 NOTE — ANESTHESIA PREPROCEDURE EVALUATION
Anesthesia Evaluation     Patient summary reviewed and Nursing notes reviewed   no history of anesthetic complications:  NPO Solid Status: > 8 hours  NPO Liquid Status: > 8 hours           Airway   Mallampati: II  TM distance: >3 FB  Neck ROM: full  no difficulty expected  Dental - normal exam     Pulmonary - normal exam   (+) a smoker Current Abstained day of surgery,   Cardiovascular - normal exam  Exercise tolerance: good (4-7 METS)    NYHA Classification: II  PT is on anticoagulation therapy  Patient on routine beta blocker and Beta blocker given within 24 hours of surgery    (+) hypertension, past MI (2016)  >12 months, CAD, cardiac stents (x1 in 2016) more than 12 months ago hyperlipidemia,   (-) dysrhythmias, angina      Neuro/Psych- negative ROS  (-) seizures, CVA  GI/Hepatic/Renal/Endo - negative ROS   (-) GERD, liver disease, no renal disease, diabetes, no thyroid disorder    Musculoskeletal (-) negative ROS    Abdominal  - normal exam    Bowel sounds: normal.   Substance History - negative use     OB/GYN negative ob/gyn ROS         Other - negative ROS       (-) arthritis                  Anesthesia Plan    ASA 3     general     intravenous induction   Anesthetic plan, all risks, benefits, and alternatives have been provided, discussed and informed consent has been obtained with: patient.  Use of blood products discussed with patient  Consented to blood products.

## 2019-11-01 NOTE — ANESTHESIA POSTPROCEDURE EVALUATION
Patient: Claude E Campbell    Procedure Summary     Date:  11/01/19 Room / Location:  [facility] OR 08 /  [facility]    Anesthesia Start:  0723 Anesthesia Stop:  0739    Procedure:  COLONOSCOPY (N/A ) Diagnosis:       Other constipation      Generalized abdominal pain      (Other constipation [K59.09])      (Generalized abdominal pain [R10.84])    Surgeon:  Bruno Lunsford MD Provider:  Micheal Hector MD    Anesthesia Type:  general ASA Status:  3          Anesthesia Type: general  Last vitals  BP   115/62 (11/01/19 0810)   Temp   97.4 °F (36.3 °C) (11/01/19 0740)   Pulse   71 (11/01/19 0810)   Resp   18 (11/01/19 0810)     SpO2   97 % (11/01/19 0810)     Post Anesthesia Care and Evaluation    Patient location during evaluation: PHASE II  Patient participation: complete - patient participated  Level of consciousness: awake and alert  Pain score: 1  Pain management: adequate  Airway patency: patent  Anesthetic complications: No anesthetic complications  PONV Status: controlled  Cardiovascular status: acceptable  Respiratory status: acceptable  Hydration status: acceptable

## 2020-01-29 NOTE — TELEPHONE ENCOUNTER
Mychart result sent   Dr. Barcenas agreed to write script.  Norco 7.5 P.O. q 6 PRN pain # 12 NR    Patient aware script is ready for

## 2020-06-23 ENCOUNTER — APPOINTMENT (OUTPATIENT)
Dept: CARDIOLOGY | Facility: HOSPITAL | Age: 55
End: 2020-06-23

## 2020-06-23 ENCOUNTER — HOSPITAL ENCOUNTER (OUTPATIENT)
Facility: HOSPITAL | Age: 55
Setting detail: OBSERVATION
Discharge: HOME | End: 2020-06-24
Attending: EMERGENCY MEDICINE | Admitting: HOSPITALIST

## 2020-06-23 ENCOUNTER — APPOINTMENT (OUTPATIENT)
Dept: GENERAL RADIOLOGY | Facility: HOSPITAL | Age: 55
End: 2020-06-23

## 2020-06-23 DIAGNOSIS — I10 ESSENTIAL HYPERTENSION: Chronic | ICD-10-CM

## 2020-06-23 DIAGNOSIS — R07.9 CHEST PAIN, UNSPECIFIED TYPE: Primary | ICD-10-CM

## 2020-06-23 PROBLEM — R73.9 HYPERGLYCEMIA: Status: ACTIVE | Noted: 2020-06-23

## 2020-06-23 PROBLEM — E87.1 HYPONATREMIA: Status: ACTIVE | Noted: 2020-06-23

## 2020-06-23 PROBLEM — E78.2 MIXED HYPERLIPIDEMIA: Chronic | Status: ACTIVE | Noted: 2017-05-20

## 2020-06-23 LAB
ALBUMIN SERPL-MCNC: 4.24 G/DL (ref 3.5–5.2)
ALBUMIN/GLOB SERPL: 1.2 G/DL
ALP SERPL-CCNC: 47 U/L (ref 39–117)
ALT SERPL W P-5'-P-CCNC: 29 U/L (ref 1–41)
ANION GAP SERPL CALCULATED.3IONS-SCNC: 8.1 MMOL/L (ref 5–15)
AST SERPL-CCNC: 25 U/L (ref 1–40)
BASOPHILS # BLD AUTO: 0.08 10*3/MM3 (ref 0–0.2)
BASOPHILS NFR BLD AUTO: 1.3 % (ref 0–1.5)
BH CV ECHO MEAS - ACS: 1.5 CM
BH CV ECHO MEAS - AO MAX PG: 9.1 MMHG
BH CV ECHO MEAS - AO MEAN PG: 5 MMHG
BH CV ECHO MEAS - AO ROOT AREA (BSA CORRECTED): 1.5
BH CV ECHO MEAS - AO ROOT AREA: 7.1 CM^2
BH CV ECHO MEAS - AO ROOT DIAM: 3 CM
BH CV ECHO MEAS - AO V2 MAX: 151 CM/SEC
BH CV ECHO MEAS - AO V2 MEAN: 109 CM/SEC
BH CV ECHO MEAS - AO V2 VTI: 37.5 CM
BH CV ECHO MEAS - BSA(HAYCOCK): 2 M^2
BH CV ECHO MEAS - BSA: 2 M^2
BH CV ECHO MEAS - BZI_BMI: 24 KILOGRAMS/M^2
BH CV ECHO MEAS - BZI_METRIC_HEIGHT: 182.9 CM
BH CV ECHO MEAS - BZI_METRIC_WEIGHT: 80.3 KG
BH CV ECHO MEAS - EDV(CUBED): 54.9 ML
BH CV ECHO MEAS - EDV(MOD-SP4): 71.1 ML
BH CV ECHO MEAS - EDV(TEICH): 62 ML
BH CV ECHO MEAS - EF(CUBED): 64.1 %
BH CV ECHO MEAS - EF(MOD-SP4): 55.3 %
BH CV ECHO MEAS - EF(TEICH): 56.4 %
BH CV ECHO MEAS - ESV(CUBED): 19.7 ML
BH CV ECHO MEAS - ESV(MOD-SP4): 31.8 ML
BH CV ECHO MEAS - ESV(TEICH): 27 ML
BH CV ECHO MEAS - FS: 28.9 %
BH CV ECHO MEAS - IVS/LVPW: 0.65
BH CV ECHO MEAS - IVSD: 1.1 CM
BH CV ECHO MEAS - LA DIMENSION: 3.4 CM
BH CV ECHO MEAS - LA/AO: 1.1
BH CV ECHO MEAS - LV DIASTOLIC VOL/BSA (35-75): 35.2 ML/M^2
BH CV ECHO MEAS - LV MASS(C)D: 194.1 GRAMS
BH CV ECHO MEAS - LV MASS(C)DI: 96 GRAMS/M^2
BH CV ECHO MEAS - LV SYSTOLIC VOL/BSA (12-30): 15.7 ML/M^2
BH CV ECHO MEAS - LVIDD: 3.8 CM
BH CV ECHO MEAS - LVIDS: 2.7 CM
BH CV ECHO MEAS - LVLD AP4: 8 CM
BH CV ECHO MEAS - LVLS AP4: 7.2 CM
BH CV ECHO MEAS - LVOT AREA (M): 2.5 CM^2
BH CV ECHO MEAS - LVOT AREA: 2.5 CM^2
BH CV ECHO MEAS - LVOT DIAM: 1.8 CM
BH CV ECHO MEAS - LVPWD: 1.2 CM
BH CV ECHO MEAS - MV A MAX VEL: 49.3 CM/SEC
BH CV ECHO MEAS - MV E MAX VEL: 86.1 CM/SEC
BH CV ECHO MEAS - MV E/A: 1.7
BH CV ECHO MEAS - PA ACC TIME: 0.17 SEC
BH CV ECHO MEAS - PA PR(ACCEL): 3 MMHG
BH CV ECHO MEAS - RAP SYSTOLE: 10 MMHG
BH CV ECHO MEAS - RVSP: 33.4 MMHG
BH CV ECHO MEAS - SI(AO): 131.1 ML/M^2
BH CV ECHO MEAS - SI(CUBED): 17.4 ML/M^2
BH CV ECHO MEAS - SI(MOD-SP4): 19.4 ML/M^2
BH CV ECHO MEAS - SI(TEICH): 17.3 ML/M^2
BH CV ECHO MEAS - SV(AO): 265.1 ML
BH CV ECHO MEAS - SV(CUBED): 35.2 ML
BH CV ECHO MEAS - SV(MOD-SP4): 39.3 ML
BH CV ECHO MEAS - SV(TEICH): 34.9 ML
BH CV ECHO MEAS - TR MAX VEL: 242 CM/SEC
BILIRUB SERPL-MCNC: 0.4 MG/DL (ref 0.2–1.2)
BUN BLD-MCNC: 16 MG/DL (ref 6–20)
BUN/CREAT SERPL: 22.2 (ref 7–25)
CALCIUM SPEC-SCNC: 9.2 MG/DL (ref 8.6–10.5)
CHLORIDE SERPL-SCNC: 102 MMOL/L (ref 98–107)
CO2 SERPL-SCNC: 24.9 MMOL/L (ref 22–29)
CREAT BLD-MCNC: 0.72 MG/DL (ref 0.76–1.27)
DEPRECATED RDW RBC AUTO: 44.7 FL (ref 37–54)
EOSINOPHIL # BLD AUTO: 0.48 10*3/MM3 (ref 0–0.4)
EOSINOPHIL NFR BLD AUTO: 7.8 % (ref 0.3–6.2)
ERYTHROCYTE [DISTWIDTH] IN BLOOD BY AUTOMATED COUNT: 12.6 % (ref 12.3–15.4)
GFR SERPL CREATININE-BSD FRML MDRD: 113 ML/MIN/1.73
GLOBULIN UR ELPH-MCNC: 3.6 GM/DL
GLUCOSE BLD-MCNC: 129 MG/DL (ref 65–99)
GLUCOSE BLDC GLUCOMTR-MCNC: 111 MG/DL (ref 70–130)
HBA1C MFR BLD: 6.1 % (ref 4.8–5.6)
HCT VFR BLD AUTO: 42.2 % (ref 37.5–51)
HGB BLD-MCNC: 13.8 G/DL (ref 13–17.7)
HOLD SPECIMEN: NORMAL
HOLD SPECIMEN: NORMAL
IMM GRANULOCYTES # BLD AUTO: 0.01 10*3/MM3 (ref 0–0.05)
IMM GRANULOCYTES NFR BLD AUTO: 0.2 % (ref 0–0.5)
LYMPHOCYTES # BLD AUTO: 2.17 10*3/MM3 (ref 0.7–3.1)
LYMPHOCYTES NFR BLD AUTO: 35.3 % (ref 19.6–45.3)
MAGNESIUM SERPL-MCNC: 1.9 MG/DL (ref 1.6–2.6)
MAXIMAL PREDICTED HEART RATE: 164 BPM
MCH RBC QN AUTO: 31.4 PG (ref 26.6–33)
MCHC RBC AUTO-ENTMCNC: 32.7 G/DL (ref 31.5–35.7)
MCV RBC AUTO: 96.1 FL (ref 79–97)
MONOCYTES # BLD AUTO: 0.46 10*3/MM3 (ref 0.1–0.9)
MONOCYTES NFR BLD AUTO: 7.5 % (ref 5–12)
NEUTROPHILS # BLD AUTO: 2.95 10*3/MM3 (ref 1.7–7)
NEUTROPHILS NFR BLD AUTO: 47.9 % (ref 42.7–76)
NRBC BLD AUTO-RTO: 0 /100 WBC (ref 0–0.2)
PHOSPHATE SERPL-MCNC: 3.6 MG/DL (ref 2.5–4.5)
PLATELET # BLD AUTO: 257 10*3/MM3 (ref 140–450)
PMV BLD AUTO: 9.2 FL (ref 6–12)
POTASSIUM BLD-SCNC: 4.1 MMOL/L (ref 3.5–5.2)
PROT SERPL-MCNC: 7.8 G/DL (ref 6–8.5)
RBC # BLD AUTO: 4.39 10*6/MM3 (ref 4.14–5.8)
SODIUM BLD-SCNC: 135 MMOL/L (ref 136–145)
STRESS TARGET HR: 139 BPM
TROPONIN T SERPL-MCNC: <0.01 NG/ML (ref 0–0.03)
TSH SERPL DL<=0.05 MIU/L-ACNC: 1.17 UIU/ML (ref 0.27–4.2)
WBC NRBC COR # BLD: 6.15 10*3/MM3 (ref 3.4–10.8)
WHOLE BLOOD HOLD SPECIMEN: NORMAL
WHOLE BLOOD HOLD SPECIMEN: NORMAL

## 2020-06-23 PROCEDURE — 82962 GLUCOSE BLOOD TEST: CPT

## 2020-06-23 PROCEDURE — 25010000002 HEPARIN (PORCINE) PER 1000 UNITS: Performed by: PHYSICIAN ASSISTANT

## 2020-06-23 PROCEDURE — G0378 HOSPITAL OBSERVATION PER HR: HCPCS

## 2020-06-23 PROCEDURE — 93306 TTE W/DOPPLER COMPLETE: CPT

## 2020-06-23 PROCEDURE — 84484 ASSAY OF TROPONIN QUANT: CPT | Performed by: PHYSICIAN ASSISTANT

## 2020-06-23 PROCEDURE — 93005 ELECTROCARDIOGRAM TRACING: CPT | Performed by: EMERGENCY MEDICINE

## 2020-06-23 PROCEDURE — 96372 THER/PROPH/DIAG INJ SC/IM: CPT

## 2020-06-23 PROCEDURE — 71045 X-RAY EXAM CHEST 1 VIEW: CPT

## 2020-06-23 PROCEDURE — 36415 COLL VENOUS BLD VENIPUNCTURE: CPT

## 2020-06-23 PROCEDURE — 99220 PR INITIAL OBSERVATION CARE/DAY 70 MINUTES: CPT | Performed by: PHYSICIAN ASSISTANT

## 2020-06-23 PROCEDURE — 85025 COMPLETE CBC W/AUTO DIFF WBC: CPT | Performed by: PHYSICIAN ASSISTANT

## 2020-06-23 PROCEDURE — 93010 ELECTROCARDIOGRAM REPORT: CPT | Performed by: INTERNAL MEDICINE

## 2020-06-23 PROCEDURE — 25010000002 MAGNESIUM SULFATE IN D5W 1G/100ML (PREMIX) 1-5 GM/100ML-% SOLUTION: Performed by: HOSPITALIST

## 2020-06-23 PROCEDURE — 99284 EMERGENCY DEPT VISIT MOD MDM: CPT

## 2020-06-23 PROCEDURE — 84484 ASSAY OF TROPONIN QUANT: CPT | Performed by: HOSPITALIST

## 2020-06-23 PROCEDURE — 83735 ASSAY OF MAGNESIUM: CPT | Performed by: HOSPITALIST

## 2020-06-23 PROCEDURE — 96365 THER/PROPH/DIAG IV INF INIT: CPT

## 2020-06-23 PROCEDURE — 84443 ASSAY THYROID STIM HORMONE: CPT | Performed by: HOSPITALIST

## 2020-06-23 PROCEDURE — 93306 TTE W/DOPPLER COMPLETE: CPT | Performed by: SPECIALIST

## 2020-06-23 PROCEDURE — 71045 X-RAY EXAM CHEST 1 VIEW: CPT | Performed by: RADIOLOGY

## 2020-06-23 PROCEDURE — 83036 HEMOGLOBIN GLYCOSYLATED A1C: CPT | Performed by: HOSPITALIST

## 2020-06-23 PROCEDURE — 80053 COMPREHEN METABOLIC PANEL: CPT | Performed by: PHYSICIAN ASSISTANT

## 2020-06-23 PROCEDURE — 84100 ASSAY OF PHOSPHORUS: CPT | Performed by: HOSPITALIST

## 2020-06-23 RX ORDER — CLOPIDOGREL BISULFATE 75 MG/1
75 TABLET ORAL DAILY
Status: DISCONTINUED | OUTPATIENT
Start: 2020-06-23 | End: 2020-06-24 | Stop reason: HOSPADM

## 2020-06-23 RX ORDER — SODIUM CHLORIDE 0.9 % (FLUSH) 0.9 %
10 SYRINGE (ML) INJECTION AS NEEDED
Status: DISCONTINUED | OUTPATIENT
Start: 2020-06-23 | End: 2020-06-23

## 2020-06-23 RX ORDER — SODIUM CHLORIDE 0.9 % (FLUSH) 0.9 %
10 SYRINGE (ML) INJECTION AS NEEDED
Status: DISCONTINUED | OUTPATIENT
Start: 2020-06-23 | End: 2020-06-24 | Stop reason: HOSPADM

## 2020-06-23 RX ORDER — CLOPIDOGREL BISULFATE 75 MG/1
75 TABLET ORAL DAILY
Status: CANCELLED | OUTPATIENT
Start: 2020-06-24

## 2020-06-23 RX ORDER — ATORVASTATIN CALCIUM 40 MG/1
80 TABLET, FILM COATED ORAL DAILY
Status: CANCELLED | OUTPATIENT
Start: 2020-06-24

## 2020-06-23 RX ORDER — CARVEDILOL 3.12 MG/1
3.12 TABLET ORAL 2 TIMES DAILY WITH MEALS
Status: CANCELLED | OUTPATIENT
Start: 2020-06-23

## 2020-06-23 RX ORDER — DOCUSATE SODIUM 100 MG/1
100 CAPSULE, LIQUID FILLED ORAL DAILY
Status: CANCELLED | OUTPATIENT
Start: 2020-06-23

## 2020-06-23 RX ORDER — ATORVASTATIN CALCIUM 40 MG/1
80 TABLET, FILM COATED ORAL DAILY
Status: DISCONTINUED | OUTPATIENT
Start: 2020-06-23 | End: 2020-06-24 | Stop reason: HOSPADM

## 2020-06-23 RX ORDER — DOCUSATE SODIUM 100 MG/1
100 CAPSULE, LIQUID FILLED ORAL 2 TIMES DAILY PRN
Status: DISCONTINUED | OUTPATIENT
Start: 2020-06-23 | End: 2020-06-24 | Stop reason: HOSPADM

## 2020-06-23 RX ORDER — SODIUM CHLORIDE 0.9 % (FLUSH) 0.9 %
10 SYRINGE (ML) INJECTION EVERY 12 HOURS SCHEDULED
Status: DISCONTINUED | OUTPATIENT
Start: 2020-06-23 | End: 2020-06-24 | Stop reason: HOSPADM

## 2020-06-23 RX ORDER — LISINOPRIL 10 MG/1
20 TABLET ORAL DAILY
Status: CANCELLED | OUTPATIENT
Start: 2020-06-23

## 2020-06-23 RX ORDER — CLOPIDOGREL BISULFATE 75 MG/1
75 TABLET ORAL DAILY
Status: CANCELLED | OUTPATIENT
Start: 2020-06-23

## 2020-06-23 RX ORDER — CARVEDILOL 3.12 MG/1
3.12 TABLET ORAL 2 TIMES DAILY WITH MEALS
Status: DISCONTINUED | OUTPATIENT
Start: 2020-06-23 | End: 2020-06-24 | Stop reason: HOSPADM

## 2020-06-23 RX ORDER — ONDANSETRON 2 MG/ML
4 INJECTION INTRAMUSCULAR; INTRAVENOUS EVERY 6 HOURS PRN
Status: DISCONTINUED | OUTPATIENT
Start: 2020-06-23 | End: 2020-06-24 | Stop reason: HOSPADM

## 2020-06-23 RX ORDER — ATORVASTATIN CALCIUM 40 MG/1
80 TABLET, FILM COATED ORAL DAILY
Status: CANCELLED | OUTPATIENT
Start: 2020-06-23

## 2020-06-23 RX ORDER — NITROGLYCERIN 0.4 MG/1
0.4 TABLET SUBLINGUAL
Status: DISCONTINUED | OUTPATIENT
Start: 2020-06-23 | End: 2020-06-24 | Stop reason: HOSPADM

## 2020-06-23 RX ORDER — FENOFIBRATE 145 MG/1
72.5 TABLET, COATED ORAL DAILY
Status: DISCONTINUED | OUTPATIENT
Start: 2020-06-23 | End: 2020-06-24 | Stop reason: HOSPADM

## 2020-06-23 RX ORDER — ASPIRIN 81 MG/1
324 TABLET, CHEWABLE ORAL ONCE
Status: COMPLETED | OUTPATIENT
Start: 2020-06-23 | End: 2020-06-23

## 2020-06-23 RX ORDER — DOCUSATE SODIUM 100 MG/1
100 CAPSULE, LIQUID FILLED ORAL DAILY
COMMUNITY
End: 2020-07-01 | Stop reason: SDUPTHER

## 2020-06-23 RX ORDER — LISINOPRIL 10 MG/1
20 TABLET ORAL DAILY
Status: CANCELLED | OUTPATIENT
Start: 2020-06-24

## 2020-06-23 RX ORDER — MAGNESIUM SULFATE 1 G/100ML
1 INJECTION INTRAVENOUS ONCE
Status: COMPLETED | OUTPATIENT
Start: 2020-06-23 | End: 2020-06-23

## 2020-06-23 RX ORDER — ACETAMINOPHEN 325 MG/1
650 TABLET ORAL EVERY 6 HOURS PRN
Status: DISCONTINUED | OUTPATIENT
Start: 2020-06-23 | End: 2020-06-24 | Stop reason: HOSPADM

## 2020-06-23 RX ORDER — DOCUSATE SODIUM 100 MG/1
100 CAPSULE, LIQUID FILLED ORAL DAILY
Status: DISCONTINUED | OUTPATIENT
Start: 2020-06-23 | End: 2020-06-24 | Stop reason: HOSPADM

## 2020-06-23 RX ORDER — HEPARIN SODIUM 5000 [USP'U]/ML
5000 INJECTION, SOLUTION INTRAVENOUS; SUBCUTANEOUS EVERY 12 HOURS SCHEDULED
Status: DISCONTINUED | OUTPATIENT
Start: 2020-06-23 | End: 2020-06-24 | Stop reason: HOSPADM

## 2020-06-23 RX ORDER — ASPIRIN 81 MG/1
81 TABLET ORAL DAILY
Status: DISCONTINUED | OUTPATIENT
Start: 2020-06-24 | End: 2020-06-24 | Stop reason: HOSPADM

## 2020-06-23 RX ADMIN — ASPIRIN 324 MG: 81 TABLET, CHEWABLE ORAL at 10:49

## 2020-06-23 RX ADMIN — NITROGLYCERIN 1 INCH: 20 OINTMENT TOPICAL at 10:49

## 2020-06-23 RX ADMIN — CARVEDILOL 3.12 MG: 3.12 TABLET, FILM COATED ORAL at 20:21

## 2020-06-23 RX ADMIN — MAGNESIUM SULFATE IN DEXTROSE 1 G: 10 INJECTION, SOLUTION INTRAVENOUS at 20:20

## 2020-06-23 RX ADMIN — ATORVASTATIN CALCIUM 80 MG: 40 TABLET, FILM COATED ORAL at 20:21

## 2020-06-23 RX ADMIN — CLOPIDOGREL 75 MG: 75 TABLET, FILM COATED ORAL at 20:21

## 2020-06-23 RX ADMIN — FENOFIBRATE 72.5 MG: 145 TABLET, FILM COATED ORAL at 20:20

## 2020-06-23 RX ADMIN — HEPARIN SODIUM 5000 UNITS: 5000 INJECTION INTRAVENOUS; SUBCUTANEOUS at 20:20

## 2020-06-23 RX ADMIN — DOCUSATE SODIUM 100 MG: 100 CAPSULE ORAL at 20:20

## 2020-06-23 NOTE — NURSING NOTE
Patient educated on the need to hold caffeine after 7p for stress in the morning.  Patient verbalizes understanding.

## 2020-06-23 NOTE — ED PROVIDER NOTES
Subjective   55-year-old white male presents secondary to chest discomfort.  Patient states he was outside working last evening on his deck.  He has a known history of coronary disease.  He had a stent placed in 2016.  He has not had a stress test since.  He states he is not very compliant with following up with cardiology.  He states that since yesterday has had multiple episodes of discomfort in his chest with some twitching episodes as well.  No tachycardia.  No syncope or presyncope.  Patient continued to have some of these episodes this morning.  This tends to be mostly exertional.  He voices no other complaints this time.          Review of Systems   Constitutional: Negative.  Negative for fever.   HENT: Negative.    Respiratory: Positive for chest tightness and shortness of breath.    Gastrointestinal: Negative.  Negative for abdominal pain.   Endocrine: Negative.    Genitourinary: Negative.  Negative for dysuria.   Skin: Negative.    Neurological: Negative.    Psychiatric/Behavioral: Negative.    All other systems reviewed and are negative.      Past Medical History:   Diagnosis Date   • Coronary artery disease    • Elevated cholesterol    • Hypertension    • Myocardial infarction (CMS/HCC)        No Known Allergies    Past Surgical History:   Procedure Laterality Date   • COLONOSCOPY N/A 11/1/2019    Procedure: COLONOSCOPY;  Surgeon: Bruno Lunsford MD;  Location: [facility];  Service: Gastroenterology   • CORONARY STENT PLACEMENT     • HERNIA REPAIR         Family History   Problem Relation Age of Onset   • Heart attack Father    • Heart attack Sister         X2   • Heart disease Brother    • Coronary artery disease Other        Social History     Socioeconomic History   • Marital status:      Spouse name: Not on file   • Number of children: Not on file   • Years of education: Not on file   • Highest education level: Not on file   Tobacco Use   • Smoking status: Current Every Day Smoker      Packs/day: 0.75     Years: 20.00     Pack years: 15.00     Types: Cigarettes   • Smokeless tobacco: Current User     Types: Snuff   Substance and Sexual Activity   • Alcohol use: Yes     Alcohol/week: 6.0 standard drinks     Types: 6 Cans of beer per week     Comment: COUPLE PER DAY    • Drug use: No   • Sexual activity: Defer           Objective   Physical Exam   Constitutional: He is oriented to person, place, and time. He appears well-developed and well-nourished. No distress.   HENT:   Head: Normocephalic and atraumatic.   Right Ear: External ear normal.   Left Ear: External ear normal.   Nose: Nose normal.   Eyes: Pupils are equal, round, and reactive to light. Conjunctivae and EOM are normal.   Neck: Normal range of motion. Neck supple. No JVD present. No tracheal deviation present.   Cardiovascular: Normal rate, regular rhythm and normal heart sounds.   No murmur heard.  Pulmonary/Chest: Effort normal and breath sounds normal. No respiratory distress. He has no wheezes.   Abdominal: Soft. Bowel sounds are normal. There is no tenderness.   Musculoskeletal: Normal range of motion. He exhibits no edema or deformity.   Neurological: He is alert and oriented to person, place, and time. No cranial nerve deficit.   Skin: Skin is warm and dry. No rash noted. He is not diaphoretic. No erythema. No pallor.   Psychiatric: He has a normal mood and affect. His behavior is normal. Thought content normal.   Nursing note and vitals reviewed.      Procedures           ED Course  ED Course as of Jun 23 1807   Tue Jun 23, 2020   1120 Sinus bradycardia nonspecific ST changes.  Similar findings from June 24, 2016 per Dr Harris   ECG 12 Lead [JI]   1125 Heart score is 4.    [JI]   1144 Paged hospitalist    [JI]   1151 Accepted by Dr Henry    [JI]      ED Course User Index  [JI] Robson Sim PA                                           MDM  Number of Diagnoses or Management Options  Chest pain, unspecified type: new and  requires workup     Amount and/or Complexity of Data Reviewed  Clinical lab tests: reviewed and ordered  Tests in the radiology section of CPT®: reviewed and ordered  Tests in the medicine section of CPT®: reviewed and ordered  Discuss the patient with other providers: yes    Risk of Complications, Morbidity, and/or Mortality  Presenting problems: moderate        Final diagnoses:   Chest pain, unspecified type            Robson Sim PA  06/23/20 4111

## 2020-06-23 NOTE — H&P
[facility] Emergency Medicine [facility]  HISTORY & PHYSICAL    Patient Identification:  Name:  Claude E Campbell  Age:  55 y.o.  Sex:  male  :  1965  MRN:  [id number]   Visit Number:  [id number]  Primary Care Physician:  Natalio Pendleton DO     Subjective     Chief complaint:   Chief Complaint   Patient presents with   • Chest Pain     History of presenting illness:   Patient is a 55 y.o. male with past medical history significant for coronary artery disease s/p PCI ABNER to RCA in the past, essential hypertension, hyperlipidemia, and former smoker  that presented to the [facility] emergency department for evaluation of chest pain.  Patient states onset of symptoms was yesterday afternoon.  Patient states that he was outside working on his deck in the heat.  Patient states he become overheated and had chest pressure.  He denies any radiating pain, no sharp stabbing pain, no palpitations.  He does report some associated dyspnea.  Patient states he initially thought he may have just been overheated or had some heat exhaustion, states he had been sweating profusely.  He states he drank water and sat down to rest and started to feel some better.  However, throughout the evening he had intermittent chest pressure, worsened with exertion and relieved with rest.  He states that this symptomatology is very different from when he had an MI previously.  Patient states his symptoms did calm down, however when he woke up this morning he did not feel like himself.  He reports generalized fatigue and weakness and some mild chest pressure but not as severe as initial onset.  As such, he decided to come to the emergency department for evaluation due to his cardiac history.  He states he has been doing good from a cardiac standpoint, last seen his cardiologist approximately 8 months ago per his memory.  He denies any syncope or near syncope.  Denies any headaches, dizziness, vision changes.  Denies  any cough, fevers, or chills.  Denies any recent travel.  Denies any abdominal pain, nausea, vomiting or change in bowel movements. Denies any recent ischemic work up.     Upon arrival to the ED, vitals were temperature 98.9, heart rate 60, respiratory rate 18, blood pressure 126/86, and oxygen saturation 98% on room air.  T- x2.  CMP with glucose 129, sodium 135, otherwise grossly unremarkable.  CBC unremarkable. Chest x-ray with no evidence of acute cardiopulmonary disease.  While emergency department, patient was administered 324 mg p.o. aspirin and 1 inch Nitrostat ointment.    Patient has been admitted to the telemetry floor for further evaluation and treatment.     Present during exam: N/A, seen in ED    Dr. Henry:  Patient seen and examined independently this evening with no family at bedside.  Yesterday while working in the porch and heat, patient felt uneasiness in the chest with sweating profusely.  This morning patient felt the same so he came to ED.  By the time he reached ED, the uneasiness was resolved.  Nitro patch was placed and patient felt relaxed.  Denies any recent illness with fever cough nausea vomiting abdominal pain and diarrhea.    ---------------------------------------------------------------------------------------------------------------------   Review of Systems   Constitutional: Positive for diaphoresis and fatigue. Negative for activity change, appetite change, chills and fever.   HENT: Negative for congestion, postnasal drip, rhinorrhea, sinus pain, sore throat and trouble swallowing.    Eyes: Negative for discharge and visual disturbance.   Respiratory: Positive for chest tightness and shortness of breath. Negative for cough and wheezing.    Cardiovascular: Negative for chest pain, palpitations and leg swelling.   Gastrointestinal: Negative for abdominal pain, constipation, diarrhea, nausea and vomiting.   Endocrine: Negative for cold intolerance and heat intolerance.    Genitourinary: Negative for decreased urine volume, dysuria, frequency and urgency.   Musculoskeletal: Negative for arthralgias, gait problem and myalgias.   Skin: Negative for color change, rash and wound.   Allergic/Immunologic: Negative for environmental allergies and immunocompromised state.   Neurological: Positive for weakness. Negative for dizziness, syncope, light-headedness and headaches.   Hematological: Negative for adenopathy. Does not bruise/bleed easily.   Psychiatric/Behavioral: Negative for confusion and decreased concentration. The patient is not nervous/anxious.       ---------------------------------------------------------------------------------------------------------------------   Past Medical History:   Diagnosis Date   • Coronary artery disease    • Elevated cholesterol    • Hypertension    • Myocardial infarction (CMS/HCC)      Past Surgical History:   Procedure Laterality Date   • COLONOSCOPY N/A 11/1/2019    Procedure: COLONOSCOPY;  Surgeon: Bruno Lunsford MD;  [facility];  Service: Gastroenterology   • CORONARY STENT PLACEMENT     • HERNIA REPAIR       Family History   Problem Relation Age of Onset   • Heart attack Father    • Heart attack Sister         X2   • Heart disease Brother    • Coronary artery disease Other      Social History     Socioeconomic History   • Marital status:      Spouse name: Not on file   • Number of children: Not on file   • Years of education: Not on file   • Highest education level: Not on file   Tobacco Use   • Smoking status: Current Every Day Smoker     Packs/day: 0.75     Years: 20.00     Pack years: 15.00     Types: Cigarettes   • Smokeless tobacco: Current User     Types: Snuff   Substance and Sexual Activity   • Alcohol use: Yes     Alcohol/week: 6.0 standard drinks     Types: 6 Cans of beer per week     Comment: COUPLE PER DAY    • Drug use: No   • Sexual activity: Defer      ---------------------------------------------------------------------------------------------------------------------   Allergies:  Patient has no known allergies.  ---------------------------------------------------------------------------------------------------------------------   Medications below are reported home medications pulling from within the system; at this time, these medications have not been reconciled unless otherwise specified and are in the verification process for further verifcation as current home medications.    Prior to Admission Medications     Prescriptions Last Dose Informant Patient Reported? Taking?    ASPIRIN LOW DOSE 81 MG EC tablet   Yes No    Take 81 mg by mouth Daily. For the heart    atorvastatin (LIPITOR) 80 MG tablet   No No    Take 1 tablet by mouth Daily.    carvedilol (COREG) 3.125 MG tablet   No No    Take 1 tablet by mouth 2 (Two) Times a Day With Meals.    clopidogrel (PLAVIX) 75 MG tablet   No No    Take 1 tablet by mouth Daily.    docusate sodium (COLACE) 100 MG capsule   No No    Take 1 capsule by mouth 2 (Two) Times a Day.    fenofibrate (TRICOR) 48 MG tablet   No No    Take 1 tablet by mouth Daily.    lisinopril (PRINIVIL,ZESTRIL) 20 MG tablet   No No    Take 1 tablet by mouth Daily.    polyethylene glycol (MIRALAX) packet   No No    Take 17 g by mouth Daily.        ---------------------------------------------------------------------------------------------------------------------    Objective     Hospital Scheduled Meds:          Current listed hospital scheduled medications may not yet reflect those currently placed in orders that are signed and held, awaiting patient's arrival to floor/unit.    ---------------------------------------------------------------------------------------------------------------------   Vital Signs:  Temp:  [98.9 °F (37.2 °C)] 98.9 °F (37.2 °C)  Heart Rate:  [52-61] 61  Resp:  [18] 18  BP: (126-139)/() 134/126  Mean Arterial  Pressure (Non-Invasive) for the past 24 hrs (Last 3 readings):   Noninvasive MAP (mmHg)   06/23/20 1203 127   06/23/20 1147 108   06/23/20 1131 99     SpO2 Percentage    06/23/20 1131 06/23/20 1147 06/23/20 1203   SpO2: 97% 99% 99%     SpO2:  [97 %-99 %] 99 %  on   ;   Device (Oxygen Therapy): room air    Body mass index is 25.77 kg/m².  Wt Readings from Last 3 Encounters:   06/23/20 86.2 kg (190 lb)   11/01/19 86.2 kg (190 lb)   10/10/19 87.5 kg (193 lb)       ---------------------------------------------------------------------------------------------------------------------   Physical Exam:  Physical Exam   Constitutional: He is oriented to person, place, and time. He appears well-developed and well-nourished.  Non-toxic appearance. No distress.   Pleasant, sitting up on ED stretcher, no acute distress noted.   HENT:   Head: Normocephalic and atraumatic.   Right Ear: External ear normal.   Left Ear: External ear normal.   Nose: Nose normal.   Mouth/Throat: Oropharynx is clear and moist and mucous membranes are normal. No oropharyngeal exudate.   Eyes: Pupils are equal, round, and reactive to light. Conjunctivae and EOM are normal. No scleral icterus.   Neck: Normal range of motion. Neck supple. No JVD present. No muscular tenderness present. Carotid bruit is not present. No tracheal deviation present. No thyromegaly present.   Cardiovascular: Normal rate, regular rhythm, normal heart sounds and intact distal pulses. Exam reveals no gallop and no friction rub.   No murmur heard.  Pulses:       Dorsalis pedis pulses are 2+ on the right side, and 2+ on the left side.        Posterior tibial pulses are 2+ on the right side, and 2+ on the left side.   Pulmonary/Chest: Effort normal and breath sounds normal. No accessory muscle usage. No tachypnea. No respiratory distress. He has no wheezes. He has no rhonchi. He has no rales. He exhibits no tenderness.   Abdominal: Soft. Bowel sounds are normal. He exhibits no  distension and no mass. There is no hepatosplenomegaly. There is no tenderness. There is no rebound and no guarding. No hernia.   Genitourinary:   Genitourinary Comments: No nagel catheter in place.   Musculoskeletal: He exhibits no edema, tenderness or deformity.        Right lower leg: He exhibits no edema.        Left lower leg: He exhibits no edema.     Vascular Status -  His right foot exhibits normal foot vasculature  and no edema. His left foot exhibits normal foot vasculature  and no edema.  Neurological: He is alert and oriented to person, place, and time. He has normal strength. No cranial nerve deficit or sensory deficit.   Awake alert.  Follows commands.  Answers questions properly.  Moves all extremities equally.  Strength and sensation intact.  No focal neurological deficits on exam.   Skin: Skin is warm and dry. Capillary refill takes less than 2 seconds. No rash noted. No erythema. No pallor.   Psychiatric: He has a normal mood and affect. His speech is normal and behavior is normal. Judgment and thought content normal. Cognition and memory are normal.   Nursing note and vitals reviewed.    ---------------------------------------------------------------------------------------------------------------------  EKG:      Telemetry:    Sinus 60s    I have personally reviewed the EKG/Telemetry strip  ---------------------------------------------------------------------------------------------------------------------   Results from last 7 days   Lab Units 06/23/20  1046   TROPONIN T ng/mL <0.010           Results from last 7 days   Lab Units 06/23/20  1046   WBC 10*3/mm3 6.15   HEMOGLOBIN g/dL 13.8   HEMATOCRIT % 42.2   MCV fL 96.1   MCHC g/dL 32.7   PLATELETS 10*3/mm3 257     Results from last 7 days   Lab Units 06/23/20  1046   SODIUM mmol/L 135*   POTASSIUM mmol/L 4.1   CHLORIDE mmol/L 102   CO2 mmol/L 24.9   BUN mg/dL 16   CREATININE mg/dL 0.72*   EGFR IF NONAFRICN AM mL/min/1.73 113   CALCIUM mg/dL 9.2    GLUCOSE mg/dL 129*   ALBUMIN g/dL 4.24   BILIRUBIN mg/dL 0.4   ALK PHOS U/L 47   AST (SGOT) U/L 25   ALT (SGPT) U/L 29   Estimated Creatinine Clearance: 139.7 mL/min (A) (by C-G formula based on SCr of 0.72 mg/dL (L)).  No results found for: HGBA1C  No results found for: TSH, FREET4     Pain Management Panel     There is no flowsheet data to display.        I have personally reviewed the above laboratory results.   ---------------------------------------------------------------------------------------------------------------------  Imaging Results (Last 7 Days)     Procedure Component Value Units Date/Time    XR Chest 1 View [[id number]] Collected:  06/23/20 1106     Updated:  06/23/20 1134    Narrative:       FINDINGS:  There is no focal alveolar infiltrate or effusion.  The cardiac silhouette is normal. The pulmonary vasculature is  unremarkable.  There is no evidence of an acute osseous abnormality.   There are no suspicious-appearing parenchymal soft tissue nodules.     Impression:       No evidence of active or acute cardiopulmonary disease on today's chest  radiograph.     This report was finalized on 6/23/2020 11:06 AM by Dr. Bruce Wray MD.      I have personally reviewed the above radiology results.     Last Echocardiogram:  Results for orders placed during the hospital encounter of 06/02/17   Adult Transthoracic Echo Complete    Narrative · Left atrial cavity size is borderline dilated.  · Left ventricular systolic function is normal. Estimated EF = 50%.  · There are no hemodynamically significant valvular lesions noted        ---------------------------------------------------------------------------------------------------------------------    Assessment & Plan      Active Hospital Problems    Diagnosis POA   • Chest pain [R07.9] Yes     · Chest pain, rule out MI in setting of known coronary artery disease s/p PCI ABNER to RCA: Patient currently chest pain free.  Troponin negative x2 in ED.  EKG without  acute ischemic changes.  Continue aspirin, Plavix, statin and Coreg. Trend serial Troponin and EKG. Continuous telemetry monitoring. NPO after midnight, obtain stress test in AM. Will also obtain transthoracic echocardiogram to evaluation LVEF and cardiac wall motion.        Discontinue Nitropatch.  Continue with sublingual nitro glycerin as        Needed.  · Essential hypertension: BP controlled, plan to continue home antihypertensive regimen once reconciled per pharmacy. Closely monitor BP per hospital protocol, titrate medications as necessary.  Resume Coreg and hold lisinopril.  · Hyperlipidemia: Obtain lipid panel, continue statin and TriCor.  · Hyperglycemia without history of diabetes mellitus: Obtain HgbA1C.   · Tobacco abuse: Encourage cessation   · F/E/N: No IV fluids. Replace electrolytes per protocol. Cardiac diet, NPO after midnight.     ---------------------------------------------------  DVT Prophylaxis: SQ heparin   GI Prophylaxis: PPI   Activity: Up with assistance as tolerated    The patient is considered to be a high risk patient due to: Chest pain, CAD, HTN, HLD, smoker     OBSERVATION status, however if further evaluation or treatment plans warrant, status may change.  Based upon current information, I predict patient's care encounter to be less than or equal to 2 midnights.    Code Status: FULL CODE     I have discussed the patient's assessment and plan with the patient, nursing staff, and attending physician.       Radha Weiss PA-C  Hospitalist Service -- Psychiatric   Pager: [phone]    06/23/20  12:08    Attending Physician: Torri Henry MD      ---------------------------------------------------------------------------------------------------------------------   Patient seen and examined independently.  I agree with assessment plan and history and physical by YAIMA Paige.  The note has been edited to reflect my findings.      Torri Henry MD   Hospital  medicine

## 2020-06-24 ENCOUNTER — APPOINTMENT (OUTPATIENT)
Dept: NUCLEAR MEDICINE | Facility: HOSPITAL | Age: 55
End: 2020-06-24

## 2020-06-24 ENCOUNTER — APPOINTMENT (OUTPATIENT)
Dept: CARDIOLOGY | Facility: HOSPITAL | Age: 55
End: 2020-06-24

## 2020-06-24 ENCOUNTER — READMISSION MANAGEMENT (OUTPATIENT)
Dept: CALL CENTER | Facility: HOSPITAL | Age: 55
End: 2020-06-24

## 2020-06-24 VITALS
HEIGHT: 72 IN | HEART RATE: 75 BPM | BODY MASS INDEX: 24.22 KG/M2 | OXYGEN SATURATION: 97 % | DIASTOLIC BLOOD PRESSURE: 76 MMHG | RESPIRATION RATE: 18 BRPM | WEIGHT: 178.8 LBS | TEMPERATURE: 98.2 F | SYSTOLIC BLOOD PRESSURE: 119 MMHG

## 2020-06-24 LAB
ANION GAP SERPL CALCULATED.3IONS-SCNC: 8 MMOL/L (ref 5–15)
BASOPHILS # BLD AUTO: 0.07 10*3/MM3 (ref 0–0.2)
BASOPHILS NFR BLD AUTO: 0.9 % (ref 0–1.5)
BH CV NUCLEAR PRIOR STUDY: 3
BH CV STRESS BP STAGE 1: NORMAL
BH CV STRESS BP STAGE 2: NORMAL
BH CV STRESS COMMENTS STAGE 1: NORMAL
BH CV STRESS COMMENTS STAGE 2: NORMAL
BH CV STRESS DOSE REGADENOSON STAGE 1: 0.4
BH CV STRESS DURATION MIN STAGE 1: 0
BH CV STRESS DURATION MIN STAGE 2: 4
BH CV STRESS DURATION SEC STAGE 1: 10
BH CV STRESS DURATION SEC STAGE 2: 0
BH CV STRESS HR STAGE 1: 99
BH CV STRESS HR STAGE 2: 60
BH CV STRESS PROTOCOL 1: NORMAL
BH CV STRESS RECOVERY BP: NORMAL MMHG
BH CV STRESS RECOVERY HR: 60 BPM
BH CV STRESS STAGE 1: 1
BH CV STRESS STAGE 2: 2
BUN BLD-MCNC: 13 MG/DL (ref 6–20)
BUN/CREAT SERPL: 17.8 (ref 7–25)
CALCIUM SPEC-SCNC: 8.9 MG/DL (ref 8.6–10.5)
CHLORIDE SERPL-SCNC: 107 MMOL/L (ref 98–107)
CHOLEST SERPL-MCNC: 145 MG/DL (ref 0–200)
CO2 SERPL-SCNC: 21 MMOL/L (ref 22–29)
CREAT BLD-MCNC: 0.73 MG/DL (ref 0.76–1.27)
DEPRECATED RDW RBC AUTO: 44.9 FL (ref 37–54)
EOSINOPHIL # BLD AUTO: 0.54 10*3/MM3 (ref 0–0.4)
EOSINOPHIL NFR BLD AUTO: 7 % (ref 0.3–6.2)
ERYTHROCYTE [DISTWIDTH] IN BLOOD BY AUTOMATED COUNT: 12.4 % (ref 12.3–15.4)
GFR SERPL CREATININE-BSD FRML MDRD: 111 ML/MIN/1.73
GLUCOSE BLD-MCNC: 113 MG/DL (ref 65–99)
HCT VFR BLD AUTO: 43.1 % (ref 37.5–51)
HDLC SERPL-MCNC: 46 MG/DL (ref 40–60)
HGB BLD-MCNC: 14.1 G/DL (ref 13–17.7)
IMM GRANULOCYTES # BLD AUTO: 0.01 10*3/MM3 (ref 0–0.05)
IMM GRANULOCYTES NFR BLD AUTO: 0.1 % (ref 0–0.5)
LDLC SERPL CALC-MCNC: 69 MG/DL (ref 0–100)
LDLC/HDLC SERPL: 1.5 {RATIO}
LV EF NUC BP: 47 %
LYMPHOCYTES # BLD AUTO: 4.07 10*3/MM3 (ref 0.7–3.1)
LYMPHOCYTES NFR BLD AUTO: 52.9 % (ref 19.6–45.3)
MAGNESIUM SERPL-MCNC: 2.2 MG/DL (ref 1.6–2.6)
MAXIMAL PREDICTED HEART RATE: 164 BPM
MCH RBC QN AUTO: 31.5 PG (ref 26.6–33)
MCHC RBC AUTO-ENTMCNC: 32.7 G/DL (ref 31.5–35.7)
MCV RBC AUTO: 96.4 FL (ref 79–97)
MONOCYTES # BLD AUTO: 0.46 10*3/MM3 (ref 0.1–0.9)
MONOCYTES NFR BLD AUTO: 6 % (ref 5–12)
NEUTROPHILS # BLD AUTO: 2.55 10*3/MM3 (ref 1.7–7)
NEUTROPHILS NFR BLD AUTO: 33.1 % (ref 42.7–76)
NRBC BLD AUTO-RTO: 0 /100 WBC (ref 0–0.2)
PERCENT MAX PREDICTED HR: 36.59 %
PLATELET # BLD AUTO: 231 10*3/MM3 (ref 140–450)
PMV BLD AUTO: 9.1 FL (ref 6–12)
POTASSIUM BLD-SCNC: 4.1 MMOL/L (ref 3.5–5.2)
RBC # BLD AUTO: 4.47 10*6/MM3 (ref 4.14–5.8)
SODIUM BLD-SCNC: 136 MMOL/L (ref 136–145)
STRESS BASELINE BP: NORMAL MMHG
STRESS BASELINE HR: 61 BPM
STRESS PERCENT HR: 43 %
STRESS POST PEAK BP: NORMAL MMHG
STRESS POST PEAK HR: 60 BPM
STRESS TARGET HR: 139 BPM
TRIGL SERPL-MCNC: 150 MG/DL (ref 0–150)
TROPONIN T SERPL-MCNC: <0.01 NG/ML (ref 0–0.03)
VLDLC SERPL-MCNC: 30 MG/DL
WBC NRBC COR # BLD: 7.7 10*3/MM3 (ref 3.4–10.8)

## 2020-06-24 PROCEDURE — 80048 BASIC METABOLIC PNL TOTAL CA: CPT | Performed by: PHYSICIAN ASSISTANT

## 2020-06-24 PROCEDURE — 83735 ASSAY OF MAGNESIUM: CPT | Performed by: HOSPITALIST

## 2020-06-24 PROCEDURE — G0378 HOSPITAL OBSERVATION PER HR: HCPCS

## 2020-06-24 PROCEDURE — 25010000002 REGADENOSON 0.4 MG/5ML SOLUTION: Performed by: HOSPITALIST

## 2020-06-24 PROCEDURE — 93018 CV STRESS TEST I&R ONLY: CPT | Performed by: INTERNAL MEDICINE

## 2020-06-24 PROCEDURE — 85025 COMPLETE CBC W/AUTO DIFF WBC: CPT | Performed by: PHYSICIAN ASSISTANT

## 2020-06-24 PROCEDURE — 93017 CV STRESS TEST TRACING ONLY: CPT

## 2020-06-24 PROCEDURE — 78452 HT MUSCLE IMAGE SPECT MULT: CPT | Performed by: INTERNAL MEDICINE

## 2020-06-24 PROCEDURE — 0 TECHNETIUM SESTAMIBI: Performed by: HOSPITALIST

## 2020-06-24 PROCEDURE — A9500 TC99M SESTAMIBI: HCPCS | Performed by: HOSPITALIST

## 2020-06-24 PROCEDURE — 84484 ASSAY OF TROPONIN QUANT: CPT | Performed by: HOSPITALIST

## 2020-06-24 PROCEDURE — 99217 PR OBSERVATION CARE DISCHARGE MANAGEMENT: CPT | Performed by: HOSPITALIST

## 2020-06-24 PROCEDURE — 78452 HT MUSCLE IMAGE SPECT MULT: CPT

## 2020-06-24 PROCEDURE — 80061 LIPID PANEL: CPT | Performed by: PHYSICIAN ASSISTANT

## 2020-06-24 RX ADMIN — TECHNETIUM TC 99M SESTAMIBI 1 DOSE: 1 INJECTION INTRAVENOUS at 08:40

## 2020-06-24 RX ADMIN — ATORVASTATIN CALCIUM 80 MG: 40 TABLET, FILM COATED ORAL at 07:41

## 2020-06-24 RX ADMIN — FENOFIBRATE 72.5 MG: 145 TABLET, FILM COATED ORAL at 07:40

## 2020-06-24 RX ADMIN — REGADENOSON 0.4 MG: 0.08 INJECTION, SOLUTION INTRAVENOUS at 10:36

## 2020-06-24 RX ADMIN — ASPIRIN 81 MG: 81 TABLET, COATED ORAL at 07:40

## 2020-06-24 RX ADMIN — TECHNETIUM TC 99M SESTAMIBI 1 DOSE: 1 INJECTION INTRAVENOUS at 10:36

## 2020-06-24 RX ADMIN — CLOPIDOGREL 75 MG: 75 TABLET, FILM COATED ORAL at 07:39

## 2020-06-24 RX ADMIN — DOCUSATE SODIUM 100 MG: 100 CAPSULE ORAL at 07:41

## 2020-06-24 NOTE — DISCHARGE SUMMARY
[facility] HOSPITALISTS DISCHARGE SUMMARY    Patient Identification:  Name:  Claude E Campbell  Age:  55 y.o.  Sex:  male  :  1965  MRN:  [id number]  Visit Number:  [id number]    Date of Admission: 2020  Date of Discharge:  2020     PCP: Natalio Pendleton, DO    DISCHARGE DIAGNOSIS  Chest pain, ACS ruled out  CAD, S/P PCI to RCA  Essential HTN  Dyslipidemia  Prediabetes with A1C 6.1  Tobacco abuse    CONSULTS   None    PROCEDURES PERFORMED  Nuclear Stress test    HOSPITAL COURSE  Mr. Clement is a 55 y.o. male with past medical history significant for coronary artery disease s/p PCI ABNER to RCA in the past, essential hypertension, hyperlipidemia, and former smoker  that presented to the [facility] emergency department for evaluation of chest pain.  Please see the admitting history and physical for further details.  Admitted under observation for chest pain rule out.  Troponins trended and remained negative.  Patient did not have any more episodes of chest pain during hospital stay.  Remained vitally stable.  Continued on home regimen of aspirin Plavix statin and beta-blocker.  2D echocardiogram with EF 51 to 55% and mild concentric hypertrophy. Had nuclear stress test done which showed myocardial perfusion imaging indicates large infarction in the inferolateral wall with no significant ischemia.  Advised medical management.  Cardiology consultation placed with Dr. Stevenson.  Case discussed with Dr. Bethea over phone and he advised medical management and to have outpatient follow-up with Op[facility] Gonzalez in 1 week.  Since patient was vitally stable, asymptomatic and would like to go home and cardiology advised outpatient follow-up, it was decided to discharge patient to home.  Discharge disposition stable.      VITAL SIGNS:  Temp:  [97.9 °F (36.6 °C)-98.5 °F (36.9 °C)] 98.2 °F (36.8 °C)  Heart Rate:  [50-75] 75  Resp:  [18] 18  BP: (104-120)/(62-77) 119/76  SpO2:  [96 %-98 %] 97 %   on   ;   Device (Oxygen Therapy): room air    Body mass index is 24.25 kg/m².  Wt Readings from Last 3 Encounters:   06/24/20 81.1 kg (178 lb 12.8 oz)   11/01/19 86.2 kg (190 lb)   10/10/19 87.5 kg (193 lb)       PHYSICAL EXAM:  General: Comfortable, Not in distress.  Well-developed and well-nourished.   HENT:  Head:  Normocephalic and atraumatic.  Mouth:  Moist mucous membranes.    Eyes:  Conjunctivae and EOM are normal.  Pupils are equal, round, and reactive to light.  No scleral icterus.    Neck:  Neck supple.  No JVD present.  trachea midline.   Cardiovascular:  Normal rate, regular rhythm with no murmur.  Pulmonary/Chest:  No respiratory distress, no wheezes, no crackles, with normal breath sounds and good air movement.  Abdomen:  Soft.  Bowel sounds are normal.  No distension and no tenderness.   Musculoskeletal:  No edema, no tenderness, and no deformity.  No red or swollen joints anywhere.    Neurological:  Alert and oriented to person, place, and time.  No cranial nerve deficit. No focal deficits. No facial droop.  No slurred speech.   Skin:  Skin is warm and dry. No rash noted. No pallor.   Peripheral vascular: pulses in all 4 extremities with no clubbing, no cyanosis, no edema.  Genitourinary: no nagel    DISCHARGE DISPOSITION   Home    DISCHARGE MEDICATIONS:     Discharge Medications      Continue These Medications      Instructions Start Date   atorvastatin 80 MG tablet  Commonly known as:  LIPITOR   80 mg, Oral, Daily      carvedilol 3.125 MG tablet  Commonly known as:  COREG   3.125 mg, Oral, 2 Times Daily With Meals      clopidogrel 75 MG tablet  Commonly known as:  PLAVIX   75 mg, Oral, Daily      docusate sodium 100 MG capsule  Commonly known as:  COLACE   100 mg, Oral, Daily      fenofibrate 48 MG tablet  Commonly known as:  TRICOR   48 mg, Oral, Daily      lisinopril 20 MG tablet  Commonly known as:  PRINIVIL,ZESTRIL   20 mg, Oral, Daily             Diet Instructions     Diet: Cardiac       Discharge Diet:  Cardiac        Activity Instructions     Activity as Tolerated          No future appointments.    Additional Instructions for the Follow-ups that You Need to Schedule     Discharge Follow-up with PCP   As directed       Currently Documented PCP:    Natalio Pendleton DO    PCP Phone Number:    [phone]     Follow Up Details:  Within 1 week         Discharge Follow-up with Specialty: Cardiology [facility]; 1 Week   As directed      Specialty:  Cardiology [facility]    Follow Up:  1 Week           Follow-up Information     Natalio Pendleton DO .    Specialty:  Family Medicine  Why:  Within 1 week  Contact information:  [address]  [address]  [facility] [address]  [phone]                    TEST  RESULTS PENDING AT DISCHARGE       CODE STATUS  Code Status and Medical Interventions:   Ordered at: 06/23/20 1549     Code Status:    CPR     Medical Interventions (Level of Support Prior to Arrest):    Full       Torri Henry MD  06/24/20  15:05       Please note that this discharge summary required less than 30 minutes to complete.    Please send a copy of this dictation to the following providers:  Natalio Pendleton DO

## 2020-06-24 NOTE — OUTREACH NOTE
Prep Survey      Responses   [facility] patient discharged from?  [facility]   Is LACE score < 7 ?  Yes   Eligibility  [facility]   Date of Admission  06/23/20   Date of Discharge  06/24/20   Discharge Disposition  Home or Self Care   Discharge diagnosis  Chest pain   COVID-19 Test Status  Not tested   Does the patient have one of the following disease processes/diagnoses(primary or secondary)?  Other   Does the patient have Home health ordered?  No   Is there a DME ordered?  No   Prep survey completed?  Yes          Chanda Lovell RN

## 2020-06-24 NOTE — PLAN OF CARE
Pt rested well. PT had no episodes of chest pain complaints of SOA. No other needs verbalized at this time. NPO since midnight, and no caffeine after 7pm last evening.

## 2020-06-25 ENCOUNTER — TRANSITIONAL CARE MANAGEMENT TELEPHONE ENCOUNTER (OUTPATIENT)
Dept: CALL CENTER | Facility: HOSPITAL | Age: 55
End: 2020-06-25

## 2020-06-25 NOTE — OUTREACH NOTE
Call Center TCM Note      Responses   [facility] patient discharged from?  [facility]   COVID-19 Test Status  Not tested   Does the patient have one of the following disease processes/diagnoses(primary or secondary)?  Other   TCM attempt successful?  Yes   Call start time  1039   Call end time  1040   Discharge diagnosis  Chest pain   Meds reviewed with patient/caregiver?  N/A   Does the patient have all medications ordered at discharge?  N/A   Is the patient taking all medications as directed (includes completed medication regime)?  Yes   Does the patient have a primary care provider?   Yes   Does the patient have an appointment with their PCP within 7 days of discharge?  Greater than 7 days   Comments regarding PCP  f/u on 7/1   What is preventing the patient from scheduling follow up appointments within 7 days of discharge?  Earlier appointment not available   Nursing Interventions  Verified appointment date/time/provider   Has the patient kept scheduled appointments due by today?  N/A   Psychosocial issues?  No   Did the patient receive a copy of their discharge instructions?  Yes   What is the patient's perception of their health status since discharge?  Improving   Is the patient/caregiver able to teach back the hierarchy of who to call/visit for symptoms/problems? PCP, Specialist, Home health nurse, Urgent Care, ED, 911  Yes   TCM call completed?  Yes   Wrap up additional comments  Patient states he is doing well, no questions or concerns at this time.          Anne Edwards RN    6/25/2020, 10:41

## 2020-07-01 ENCOUNTER — OFFICE VISIT (OUTPATIENT)
Dept: FAMILY MEDICINE CLINIC | Facility: CLINIC | Age: 55
End: 2020-07-01

## 2020-07-01 VITALS
HEIGHT: 72 IN | WEIGHT: 182.8 LBS | OXYGEN SATURATION: 97 % | HEART RATE: 66 BPM | TEMPERATURE: 98.4 F | SYSTOLIC BLOOD PRESSURE: 122 MMHG | DIASTOLIC BLOOD PRESSURE: 76 MMHG | BODY MASS INDEX: 24.76 KG/M2

## 2020-07-01 DIAGNOSIS — K59.09 CHRONIC CONSTIPATION: ICD-10-CM

## 2020-07-01 DIAGNOSIS — Z09 HOSPITAL DISCHARGE FOLLOW-UP: Primary | ICD-10-CM

## 2020-07-01 DIAGNOSIS — E78.2 MIXED HYPERLIPIDEMIA: Chronic | ICD-10-CM

## 2020-07-01 DIAGNOSIS — I25.10 CORONARY ARTERY DISEASE INVOLVING NATIVE CORONARY ARTERY OF NATIVE HEART WITHOUT ANGINA PECTORIS: Chronic | ICD-10-CM

## 2020-07-01 DIAGNOSIS — I10 ESSENTIAL HYPERTENSION: Chronic | ICD-10-CM

## 2020-07-01 DIAGNOSIS — F17.200 CURRENT EVERY DAY SMOKER: ICD-10-CM

## 2020-07-01 PROCEDURE — 99214 OFFICE O/P EST MOD 30 MIN: CPT | Performed by: FAMILY MEDICINE

## 2020-07-01 RX ORDER — CARVEDILOL 3.12 MG/1
3.12 TABLET ORAL 2 TIMES DAILY WITH MEALS
Qty: 180 TABLET | Refills: 1 | Status: SHIPPED | OUTPATIENT
Start: 2020-07-01 | End: 2020-10-01 | Stop reason: SDUPTHER

## 2020-07-01 RX ORDER — DOCUSATE SODIUM 100 MG/1
100 CAPSULE, LIQUID FILLED ORAL DAILY
Qty: 90 CAPSULE | Refills: 3 | Status: SHIPPED | OUTPATIENT
Start: 2020-07-01 | End: 2020-10-01 | Stop reason: SDUPTHER

## 2020-07-01 RX ORDER — CLOPIDOGREL BISULFATE 75 MG/1
75 TABLET ORAL DAILY
Qty: 90 TABLET | Refills: 1 | Status: SHIPPED | OUTPATIENT
Start: 2020-07-01 | End: 2020-10-01 | Stop reason: SDUPTHER

## 2020-07-01 RX ORDER — FENOFIBRATE 48 MG/1
48 TABLET, COATED ORAL DAILY
Qty: 90 TABLET | Refills: 1 | Status: SHIPPED | OUTPATIENT
Start: 2020-07-01 | End: 2020-10-01 | Stop reason: SDUPTHER

## 2020-07-01 RX ORDER — ATORVASTATIN CALCIUM 80 MG/1
80 TABLET, FILM COATED ORAL DAILY
Qty: 90 TABLET | Refills: 1 | Status: SHIPPED | OUTPATIENT
Start: 2020-07-01 | End: 2020-10-01 | Stop reason: SDUPTHER

## 2020-07-01 RX ORDER — LISINOPRIL 20 MG/1
20 TABLET ORAL DAILY
Qty: 90 TABLET | Refills: 1 | Status: SHIPPED | OUTPATIENT
Start: 2020-07-01 | End: 2020-10-01 | Stop reason: SDUPTHER

## 2020-07-01 NOTE — PROGRESS NOTES
Subjective   Claude E Campbell is a 55 y.o. male.   Pt presents today with CC of Transitional Care Management      History of Present Illness   Patient is a 55-year-old male here to follow-up from hospitalization.  He was admitted on 6/23/2020 and discharged on 6/24/2020.  It was a chest pain rule out with a history of coronary artery disease.  He had PCI of RCA.  No intervention at that time.  His medications were not changed.  He has a follow-up in a few days with cardiology.  Echocardiogram was normal, troponins stayed negative.  He was advised that he continue medical management.  #2 patient has chronic constipation for which he takes Colace 100 mg daily, he also takes MiraLAX as needed.  #3 patient is a current everyday smoker.  He is interested in trying to quit, he is, try on his own.  #4 he has hypertension for which he takes lisinopril 20 mg daily and carvedilol 3.125 mg twice a day.  #5 he has hyperlipidemia for which he takes TriCor and Lipitor.  His lipid panel recently looked good.  #6 for his coronary artery disease he takes Plavix 75 mg daily.       The following portions of the patient's history were reviewed and updated as appropriate: allergies, current medications, past family history, past medical history, past social history, past surgical history and problem list.    Review of Systems   Constitutional: Negative for chills, fever and unexpected weight loss.   HENT: Negative for congestion and sore throat.    Eyes: Negative for blurred vision and visual disturbance.   Respiratory: Negative for cough and wheezing.    Cardiovascular: Negative for chest pain, palpitations and leg swelling.   Gastrointestinal: Negative for abdominal pain and diarrhea.   Endocrine: Negative for cold intolerance and heat intolerance.   Genitourinary: Negative for dysuria.   Musculoskeletal: Negative for arthralgias and neck stiffness.   Neurological: Negative for dizziness, seizures and syncope.    Psychiatric/Behavioral: Negative for self-injury, suicidal ideas and depressed mood.       Objective   Physical Exam   Constitutional: He is oriented to person, place, and time. He appears well-developed and well-nourished.   HENT:   Head: Normocephalic and atraumatic.   Right Ear: External ear normal.   Left Ear: External ear normal.   Nose: Nose normal.   Mouth/Throat: Oropharynx is clear and moist.   Eyes: Pupils are equal, round, and reactive to light. Conjunctivae and EOM are normal.   Neck: Normal range of motion. Neck supple.   Cardiovascular: Normal rate, regular rhythm and normal heart sounds.   Pulmonary/Chest: Effort normal and breath sounds normal.   Abdominal: Soft. Bowel sounds are normal.   Neurological: He is alert and oriented to person, place, and time.   Skin: Skin is warm and dry.   Nursing note and vitals reviewed.        Assessment/Plan   Claude was seen today for transitional care management.    Diagnoses and all orders for this visit:    Hospital discharge follow-up  Patient is here to follow-up from the hospital.  He has a follow-up with cardiology here in a few days.  They are getting treated medically at this time.  Recommend no changes to his medication regimen today as everything seems to be stable and he feels well.  Chronic constipation  -     docusate sodium (COLACE) 100 MG capsule; Take 1 capsule by mouth Daily.    Current every day smoker  As below  Essential hypertension  -     lisinopril (PRINIVIL,ZESTRIL) 20 MG tablet; Take 1 tablet by mouth Daily.  -     carvedilol (COREG) 3.125 MG tablet; Take 1 tablet by mouth 2 (Two) Times a Day With Meals.    Mixed hyperlipidemia  -     fenofibrate (TRICOR) 48 MG tablet; Take 1 tablet by mouth Daily.  -     atorvastatin (LIPITOR) 80 MG tablet; Take 1 tablet by mouth Daily.    Coronary artery disease involving native coronary artery of native heart without angina pectoris  -     clopidogrel (PLAVIX) 75 MG tablet; Take 1 tablet by mouth  Daily.                 Claude E Campbell  reports that he has been smoking cigarettes. He has a 15.00 pack-year smoking history. His smokeless tobacco use includes snuff.. I have educated him on the risk of diseases from using tobacco products such as cancer, COPD and heart diease.     I advised him to quit and he is not willing to quit.    I spent 3  minutes counseling the patient.         Patient's Body mass index is 24.79 kg/m². BMI is above normal parameters. Recommendations include: exercise counseling and nutrition counseling.

## 2020-07-02 ENCOUNTER — OFFICE VISIT (OUTPATIENT)
Dept: CARDIOLOGY | Facility: CLINIC | Age: 55
End: 2020-07-02

## 2020-07-02 VITALS
BODY MASS INDEX: 24.79 KG/M2 | WEIGHT: 183 LBS | DIASTOLIC BLOOD PRESSURE: 83 MMHG | OXYGEN SATURATION: 98 % | HEART RATE: 75 BPM | HEIGHT: 72 IN | SYSTOLIC BLOOD PRESSURE: 126 MMHG

## 2020-07-02 DIAGNOSIS — I10 ESSENTIAL HYPERTENSION: Chronic | ICD-10-CM

## 2020-07-02 DIAGNOSIS — E78.2 MIXED HYPERLIPIDEMIA: Chronic | ICD-10-CM

## 2020-07-02 DIAGNOSIS — I25.10 CORONARY ARTERY DISEASE INVOLVING NATIVE CORONARY ARTERY OF NATIVE HEART WITHOUT ANGINA PECTORIS: Primary | Chronic | ICD-10-CM

## 2020-07-02 PROCEDURE — 99213 OFFICE O/P EST LOW 20 MIN: CPT | Performed by: NURSE PRACTITIONER

## 2020-07-02 NOTE — PATIENT INSTRUCTIONS
Steps to Quit Smoking    Smoking tobacco can be bad for your health. It can also affect almost every organ in your body. Smoking puts you and people around you at risk for many serious long-lasting (chronic) diseases. Quitting smoking is hard, but it is one of the best things that you can do for your health. It is never too late to quit.  What are the benefits of quitting smoking?  When you quit smoking, you lower your risk for getting serious diseases and conditions. They can include:  · Lung cancer or lung disease.  · Heart disease.  · Stroke.  · Heart attack.  · Not being able to have children (infertility).  · Weak bones (osteoporosis) and broken bones (fractures).     If you have coughing, wheezing, and shortness of breath, those symptoms may get better when you quit. You may also get sick less often. If you are pregnant, quitting smoking can help to lower your chances of having a baby of low birth weight.  What can I do to help me quit smoking?  Talk with your doctor about what can help you quit smoking. Some things you can do (strategies) include:  · Quitting smoking totally, instead of slowly cutting back how much you smoke over a period of time.  · Going to in-person counseling. You are more likely to quit if you go to many counseling sessions.  · Using resources and support systems, such as:  ? Online chats with a counselor.  ? Phone quitlines.  ? Printed self-help materials.  ? Support groups or group counseling.  ? Text messaging programs.  ? Mobile phone apps or applications.  · Taking medicines. Some of these medicines may have nicotine in them. If you are pregnant or breastfeeding, do not take any medicines to quit smoking unless your doctor says it is okay. Talk with your doctor about counseling or other things that can help you.     Talk with your doctor about using more than one strategy at the same time, such as taking medicines while you are also going to in-person counseling. This can help make  quitting easier.  What things can I do to make it easier to quit?  Quitting smoking might feel very hard at first, but there is a lot that you can do to make it easier. Take these steps:  · Talk to your family and friends. Ask them to support and encourage you.  · Call phone quitlines, reach out to support groups, or work with a counselor.  · Ask people who smoke to not smoke around you.  · Avoid places that make you want (trigger) to smoke, such as:  ? Bars.  ? Parties.  ? Smoke-break areas at work.  · Spend time with people who do not smoke.  · Lower the stress in your life. Stress can make you want to smoke. Try these things to help your stress:  ? Getting regular exercise.  ? Deep-breathing exercises.  ? Yoga.  ? Meditating.  ? Doing a body scan. To do this, close your eyes, focus on one area of your body at a time from head to toe, and notice which parts of your body are tense. Try to relax the muscles in those areas.  · Download or buy apps on your mobile phone or tablet that can help you stick to your quit plan. There are many free apps, such as QuitGuide from the CDC (Centers for Disease Control and Prevention). You can find more support from smokefree.gov and other websites.     This information is not intended to replace advice given to you by your health care provider. Make sure you discuss any questions you have with your health care provider.  Document Released: 10/14/2010 Document Revised: 08/15/2017 Document Reviewed: 05/03/2016  [employer] Interactive Patient Education © 2019 [employer]

## 2020-07-02 NOTE — PROGRESS NOTES
Subjective     Chief Complaint: Coronary Artery Disease; Hypertension; and Dyslipidemia    History of Present Illness   Claude E Campbell is a 55 y.o. male who presents with a past medical history significant for ASCVD, status post AMI on 3/5/2016 with PCI ABNER to the RCA, hypertension, dyslipidemia, and tobacco use.  He presents today for his regular follow-up and follow-up from his recent hospitalization.    He denies any chest pain, palpitations or lower extremity swelling.  He states that he has been doing well since his hospitalization.  He states that on the date that he was in in the ED he had a episode of chest discomfort and feeling of fatigue.  He has not had any problems since that time.      Current Outpatient Medications:   •  atorvastatin (LIPITOR) 80 MG tablet, Take 1 tablet by mouth Daily., Disp: 90 tablet, Rfl: 1  •  carvedilol (COREG) 3.125 MG tablet, Take 1 tablet by mouth 2 (Two) Times a Day With Meals., Disp: 180 tablet, Rfl: 1  •  clopidogrel (PLAVIX) 75 MG tablet, Take 1 tablet by mouth Daily., Disp: 90 tablet, Rfl: 1  •  docusate sodium (COLACE) 100 MG capsule, Take 1 capsule by mouth Daily., Disp: 90 capsule, Rfl: 3  •  fenofibrate (TRICOR) 48 MG tablet, Take 1 tablet by mouth Daily., Disp: 90 tablet, Rfl: 1  •  lisinopril (PRINIVIL,ZESTRIL) 20 MG tablet, Take 1 tablet by mouth Daily., Disp: 90 tablet, Rfl: 1     Current outpatient and discharge medications have been reconciled for the patient.  Reviewed by: VISHAL Keys    On this date:  The following portions of the patient's history were reviewed and updated as appropriate: allergies, current medications, past family history, past medical history, past social history, past surgical history and problem list.    Review of Systems   Constitution: Negative for malaise/fatigue.   Cardiovascular: Negative for chest pain, dyspnea on exertion, irregular heartbeat, leg swelling, near-syncope, orthopnea, palpitations, paroxysmal nocturnal  "dyspnea and syncope.   Respiratory: Negative for shortness of breath.    Hematologic/Lymphatic: Does not bruise/bleed easily.   Neurological: Negative for dizziness, light-headedness and weakness.         Objective     /83 (BP Location: Left arm, Patient Position: Sitting)   Pulse 75   Ht 182.9 cm (72\")   Wt 83 kg (183 lb)   SpO2 98%   BMI 24.82 kg/m²     Physical Exam   Constitutional: He is oriented to person, place, and time. He appears well-developed and well-nourished.   HENT:   Head: Normocephalic and atraumatic.   Eyes: Pupils are equal, round, and reactive to light.   Neck: No JVD present.   Cardiovascular: Normal rate, regular rhythm and intact distal pulses. Exam reveals no gallop and no friction rub.   No murmur heard.  Pulmonary/Chest: Effort normal and breath sounds normal. No respiratory distress. He has no wheezes. He has no rales.   Neurological: He is alert and oriented to person, place, and time.   Skin: Skin is warm and dry.   Psychiatric: He has a normal mood and affect.   Vitals reviewed.      Procedures      Assessment/Plan       Claude was seen today for coronary artery disease, hypertension and dyslipidemia.    Diagnoses and all orders for this visit:    Coronary artery disease involving native coronary artery of native heart without angina pectoris    Mixed hyperlipidemia    Essential hypertension          Plan of care was reviewed.  Medication changes were made as noted below.  Patient agreed with plan of care.    Nuclear stress test and echocardiogram which were completed during his hospitalization were reviewed and discussed with the patient.  Labs were reviewed as well.    CAD is stable.  Continue Plavix, lisinopril, carvedilol, and atorvastatin.    With regard to dyslipidemia, lipid panel on 6/24/2020 shows total cholesterol 145, triglycerides 150 and LDL cholesterol 69.  Continue atorvastatin and TriCor.    Hypertension, controlled.    Return in about 6 months (around " 1/2/2021).      Opal Ho, APRN

## 2020-10-01 ENCOUNTER — OFFICE VISIT (OUTPATIENT)
Dept: FAMILY MEDICINE CLINIC | Facility: CLINIC | Age: 55
End: 2020-10-01

## 2020-10-01 VITALS
HEART RATE: 81 BPM | SYSTOLIC BLOOD PRESSURE: 134 MMHG | BODY MASS INDEX: 26.34 KG/M2 | TEMPERATURE: 97.3 F | HEIGHT: 72 IN | OXYGEN SATURATION: 98 % | WEIGHT: 194.5 LBS | DIASTOLIC BLOOD PRESSURE: 78 MMHG

## 2020-10-01 DIAGNOSIS — E78.2 MIXED HYPERLIPIDEMIA: Chronic | ICD-10-CM

## 2020-10-01 DIAGNOSIS — R73.9 HYPERGLYCEMIA: ICD-10-CM

## 2020-10-01 DIAGNOSIS — Z23 NEED FOR INFLUENZA VACCINATION: ICD-10-CM

## 2020-10-01 DIAGNOSIS — I25.10 CORONARY ARTERY DISEASE INVOLVING NATIVE CORONARY ARTERY OF NATIVE HEART WITHOUT ANGINA PECTORIS: ICD-10-CM

## 2020-10-01 DIAGNOSIS — I10 ESSENTIAL HYPERTENSION: ICD-10-CM

## 2020-10-01 DIAGNOSIS — R63.5 WEIGHT GAIN: ICD-10-CM

## 2020-10-01 DIAGNOSIS — F17.200 CURRENT EVERY DAY SMOKER: Primary | ICD-10-CM

## 2020-10-01 DIAGNOSIS — R68.82 DECREASED LIBIDO: ICD-10-CM

## 2020-10-01 DIAGNOSIS — K59.09 CHRONIC CONSTIPATION: ICD-10-CM

## 2020-10-01 PROCEDURE — 80053 COMPREHEN METABOLIC PANEL: CPT | Performed by: FAMILY MEDICINE

## 2020-10-01 PROCEDURE — 83036 HEMOGLOBIN GLYCOSYLATED A1C: CPT | Performed by: FAMILY MEDICINE

## 2020-10-01 PROCEDURE — 84403 ASSAY OF TOTAL TESTOSTERONE: CPT | Performed by: FAMILY MEDICINE

## 2020-10-01 PROCEDURE — 84402 ASSAY OF FREE TESTOSTERONE: CPT | Performed by: FAMILY MEDICINE

## 2020-10-01 PROCEDURE — 90686 IIV4 VACC NO PRSV 0.5 ML IM: CPT | Performed by: FAMILY MEDICINE

## 2020-10-01 PROCEDURE — 85025 COMPLETE CBC W/AUTO DIFF WBC: CPT | Performed by: FAMILY MEDICINE

## 2020-10-01 PROCEDURE — 99214 OFFICE O/P EST MOD 30 MIN: CPT | Performed by: FAMILY MEDICINE

## 2020-10-01 PROCEDURE — 90471 IMMUNIZATION ADMIN: CPT | Performed by: FAMILY MEDICINE

## 2020-10-01 RX ORDER — DOCUSATE SODIUM 100 MG/1
100 CAPSULE, LIQUID FILLED ORAL DAILY PRN
Qty: 90 CAPSULE | Refills: 2 | Status: SHIPPED | OUTPATIENT
Start: 2020-10-01

## 2020-10-01 RX ORDER — LISINOPRIL 20 MG/1
20 TABLET ORAL DAILY
Qty: 90 TABLET | Refills: 1 | Status: SHIPPED | OUTPATIENT
Start: 2020-10-01 | End: 2021-06-30 | Stop reason: SDUPTHER

## 2020-10-01 RX ORDER — CARVEDILOL 3.12 MG/1
3.12 TABLET ORAL 2 TIMES DAILY WITH MEALS
Qty: 180 TABLET | Refills: 1 | Status: SHIPPED | OUTPATIENT
Start: 2020-10-01 | End: 2021-06-30 | Stop reason: SDUPTHER

## 2020-10-01 RX ORDER — CLOPIDOGREL BISULFATE 75 MG/1
75 TABLET ORAL DAILY
Qty: 90 TABLET | Refills: 1 | Status: SHIPPED | OUTPATIENT
Start: 2020-10-01 | End: 2021-06-30 | Stop reason: SDUPTHER

## 2020-10-01 RX ORDER — FENOFIBRATE 48 MG/1
48 TABLET, COATED ORAL DAILY
Qty: 90 TABLET | Refills: 1 | Status: SHIPPED | OUTPATIENT
Start: 2020-10-01 | End: 2021-06-30 | Stop reason: SDUPTHER

## 2020-10-01 RX ORDER — ATORVASTATIN CALCIUM 80 MG/1
80 TABLET, FILM COATED ORAL DAILY
Qty: 90 TABLET | Refills: 1 | Status: SHIPPED | OUTPATIENT
Start: 2020-10-01 | End: 2021-06-30 | Stop reason: SDUPTHER

## 2020-10-01 NOTE — PROGRESS NOTES
Subjective   Claude E Campbell is a 55 y.o. male.   Pt presents today with CC of Hypertension      History of Present Illness   1.  Patient is a current everyday smoker.  He is interested in trying to quit.  He has never tried anything before.  He says he only smokes late in the evening when he drinks beer.  During the day he uses snuff.  He thinks that he would be able to quit smoking on his own, he is more concerned about quitting snuff in the future.  #2 he has hypertension associated with coronary artery disease and prediabetes.  He takes lisinopril 20 mg daily and Coreg 3.125 twice a day.  He is doing well on this current regimen.  #3 he has coronary artery disease.  He follows with cardiology.  He takes aspirin and Plavix.  He is on lisinopril, beta-blocker, statin, and fenofibrate.  #4 he has gained 16 pound since his last visit.  #5 he is interested in a flu shot today.  #6 he reports 10 years of erectile dysfunction, he also reports recent decrease in libido.  He is concerned about the decrease in libido and would like his testosterone checked.  He denies significant fatigue, no chest pain, no other new symptoms.         The following portions of the patient's history were reviewed and updated as appropriate: allergies, current medications, past family history, past medical history, past social history, past surgical history and problem list.    Review of Systems   Constitutional: Negative for chills, fever and unexpected weight loss.   HENT: Negative for congestion and sore throat.    Eyes: Negative for blurred vision and visual disturbance.   Respiratory: Negative for cough and wheezing.    Cardiovascular: Negative for chest pain and palpitations.   Gastrointestinal: Negative for abdominal pain and diarrhea.   Endocrine: Negative for cold intolerance and heat intolerance.   Genitourinary: Negative for dysuria.   Musculoskeletal: Negative for arthralgias and neck stiffness.   Neurological: Negative for  dizziness, seizures and syncope.   Psychiatric/Behavioral: Negative for self-injury, suicidal ideas and depressed mood.       Objective   Physical Exam  Vitals signs and nursing note reviewed.   Constitutional:       Appearance: He is well-developed.   HENT:      Head: Normocephalic and atraumatic.      Right Ear: External ear normal.      Left Ear: External ear normal.      Nose: Nose normal.   Eyes:      Conjunctiva/sclera: Conjunctivae normal.      Pupils: Pupils are equal, round, and reactive to light.   Neck:      Musculoskeletal: Normal range of motion and neck supple.   Cardiovascular:      Rate and Rhythm: Normal rate and regular rhythm.      Heart sounds: Normal heart sounds.   Pulmonary:      Effort: Pulmonary effort is normal.      Breath sounds: Normal breath sounds.   Abdominal:      General: Bowel sounds are normal.      Palpations: Abdomen is soft.   Genitourinary:     Comments: Deferred to physical  Skin:     General: Skin is warm and dry.   Neurological:      Mental Status: He is alert and oriented to person, place, and time.   Psychiatric:         Behavior: Behavior normal.           Assessment/Plan   Claude was seen today for hypertension.    Diagnoses and all orders for this visit:    Current every day smoker  He is going to try and quit smoking cold turkey.  He is going to slowly try to decrease the amount he uses snuff after he quit smoking.  He does not want pharmacologic intervention at this time.  Essential hypertension  -     CBC Auto Differential; Future  -     Comprehensive Metabolic Panel; Future  -     lisinopril (PRINIVIL,ZESTRIL) 20 MG tablet; Take 1 tablet by mouth Daily.  -     carvedilol (COREG) 3.125 MG tablet; Take 1 tablet by mouth 2 (Two) Times a Day With Meals.  -     CBC Auto Differential  -     Comprehensive Metabolic Panel    Coronary artery disease involving native coronary artery of native heart without angina pectoris  -     CBC Auto Differential; Future  -      Comprehensive Metabolic Panel; Future  -     clopidogrel (PLAVIX) 75 MG tablet; Take 1 tablet by mouth Daily.  -     CBC Auto Differential  -     Comprehensive Metabolic Panel  Follows with cardiology.  No changes made to his cardiology regimen today.  Weight gain  -     Hemoglobin A1c; Future  -     Hemoglobin A1c    Chronic constipation  -     docusate sodium (COLACE) 100 MG capsule; Take 1 capsule by mouth Daily As Needed for Constipation.    Mixed hyperlipidemia  -     fenofibrate (TRICOR) 48 MG tablet; Take 1 tablet by mouth Daily.  -     atorvastatin (LIPITOR) 80 MG tablet; Take 1 tablet by mouth Daily.    Need for influenza vaccination  Flu shot given today.  Hyperglycemia  -     Hemoglobin A1c; Future  -     Hemoglobin A1c  We will recheck hyper glycemia.  Hemoglobin A1c was 6.1 earlier this year when he weighed 16 pounds lighter.  If he is diabetic, we will have him follow-up sooner.  Decreased libido  -     Testosterone, Free, Total; Future  -     Testosterone, Free, Total                 Claude E Campbell  reports that he has been smoking cigarettes. He has a 15.00 pack-year smoking history. His smokeless tobacco use includes snuff.. I have educated him on the risk of diseases from using tobacco products such as cancer, COPD and heart diease.     I advised him to quit and he is willing to quit. We have discussed the following method/s for tobacco cessation:  [place].  Together we have set a quit date for 1 month from today.  He will follow up with me in 3 months or sooner to check on his progress.    I spent 3  minutes counseling the patient.         Patient's Body mass index is 26.37 kg/m². BMI is above normal parameters. Recommendations include: exercise counseling and nutrition counseling.

## 2020-10-02 LAB
ALBUMIN SERPL-MCNC: 4.1 G/DL (ref 3.5–5.2)
ALBUMIN/GLOB SERPL: 1.2 G/DL
ALP SERPL-CCNC: 58 U/L (ref 39–117)
ALT SERPL W P-5'-P-CCNC: 29 U/L (ref 1–41)
ANION GAP SERPL CALCULATED.3IONS-SCNC: 10.8 MMOL/L (ref 5–15)
AST SERPL-CCNC: 24 U/L (ref 1–40)
BASOPHILS # BLD AUTO: 0.08 10*3/MM3 (ref 0–0.2)
BASOPHILS NFR BLD AUTO: 1.1 % (ref 0–1.5)
BILIRUB SERPL-MCNC: <0.2 MG/DL (ref 0–1.2)
BUN SERPL-MCNC: 18 MG/DL (ref 6–20)
BUN/CREAT SERPL: 24 (ref 7–25)
CALCIUM SPEC-SCNC: 9.5 MG/DL (ref 8.6–10.5)
CHLORIDE SERPL-SCNC: 104 MMOL/L (ref 98–107)
CO2 SERPL-SCNC: 24.2 MMOL/L (ref 22–29)
CREAT SERPL-MCNC: 0.75 MG/DL (ref 0.76–1.27)
DEPRECATED RDW RBC AUTO: 44.2 FL (ref 37–54)
EOSINOPHIL # BLD AUTO: 0.68 10*3/MM3 (ref 0–0.4)
EOSINOPHIL NFR BLD AUTO: 9 % (ref 0.3–6.2)
ERYTHROCYTE [DISTWIDTH] IN BLOOD BY AUTOMATED COUNT: 12.5 % (ref 12.3–15.4)
GFR SERPL CREATININE-BSD FRML MDRD: 108 ML/MIN/1.73
GLOBULIN UR ELPH-MCNC: 3.5 GM/DL
GLUCOSE SERPL-MCNC: 121 MG/DL (ref 65–99)
HBA1C MFR BLD: 5.5 % (ref 4.8–5.6)
HCT VFR BLD AUTO: 41 % (ref 37.5–51)
HGB BLD-MCNC: 13.9 G/DL (ref 13–17.7)
IMM GRANULOCYTES # BLD AUTO: 0.02 10*3/MM3 (ref 0–0.05)
IMM GRANULOCYTES NFR BLD AUTO: 0.3 % (ref 0–0.5)
LYMPHOCYTES # BLD AUTO: 2.51 10*3/MM3 (ref 0.7–3.1)
LYMPHOCYTES NFR BLD AUTO: 33.1 % (ref 19.6–45.3)
MCH RBC QN AUTO: 32.3 PG (ref 26.6–33)
MCHC RBC AUTO-ENTMCNC: 33.9 G/DL (ref 31.5–35.7)
MCV RBC AUTO: 95.1 FL (ref 79–97)
MONOCYTES # BLD AUTO: 0.43 10*3/MM3 (ref 0.1–0.9)
MONOCYTES NFR BLD AUTO: 5.7 % (ref 5–12)
NEUTROPHILS NFR BLD AUTO: 3.86 10*3/MM3 (ref 1.7–7)
NEUTROPHILS NFR BLD AUTO: 50.8 % (ref 42.7–76)
NRBC BLD AUTO-RTO: 0.1 /100 WBC (ref 0–0.2)
PLATELET # BLD AUTO: 280 10*3/MM3 (ref 140–450)
PMV BLD AUTO: 10.4 FL (ref 6–12)
POTASSIUM SERPL-SCNC: 4.3 MMOL/L (ref 3.5–5.2)
PROT SERPL-MCNC: 7.6 G/DL (ref 6–8.5)
RBC # BLD AUTO: 4.31 10*6/MM3 (ref 4.14–5.8)
SODIUM SERPL-SCNC: 139 MMOL/L (ref 136–145)
WBC # BLD AUTO: 7.58 10*3/MM3 (ref 3.4–10.8)

## 2020-10-03 LAB
TESTOST FREE SERPL-MCNC: 4.5 PG/ML (ref 7.2–24)
TESTOST SERPL-MCNC: 263 NG/DL (ref 264–916)

## 2020-10-06 ENCOUNTER — TELEPHONE (OUTPATIENT)
Dept: FAMILY MEDICINE CLINIC | Facility: CLINIC | Age: 55
End: 2020-10-06

## 2020-10-06 NOTE — TELEPHONE ENCOUNTER
----- Message from Natalio Pendleton DO sent at 10/6/2020  2:55 PM EDT -----  I reviewed your labs.  No concerns with exception of low testosterone.  Your testosterone was in the low end.  Recommend rechecking at follow-up before considering supplementation.  The next time we get labs, we will recheck your testosterone levels.  Because of your history of heart disease, I want to double check before starting supplementation.      Patient notified & verbalized understanding.

## 2020-12-29 ENCOUNTER — OFFICE VISIT (OUTPATIENT)
Dept: CARDIOLOGY | Facility: CLINIC | Age: 55
End: 2020-12-29

## 2020-12-29 VITALS
HEART RATE: 64 BPM | DIASTOLIC BLOOD PRESSURE: 108 MMHG | HEIGHT: 73 IN | BODY MASS INDEX: 25.26 KG/M2 | SYSTOLIC BLOOD PRESSURE: 170 MMHG | WEIGHT: 190.6 LBS | OXYGEN SATURATION: 98 %

## 2020-12-29 DIAGNOSIS — I25.10 CORONARY ARTERY DISEASE INVOLVING NATIVE CORONARY ARTERY OF NATIVE HEART WITHOUT ANGINA PECTORIS: Primary | Chronic | ICD-10-CM

## 2020-12-29 DIAGNOSIS — I10 ESSENTIAL HYPERTENSION: Chronic | ICD-10-CM

## 2020-12-29 DIAGNOSIS — E78.2 MIXED HYPERLIPIDEMIA: Chronic | ICD-10-CM

## 2020-12-29 PROCEDURE — 99214 OFFICE O/P EST MOD 30 MIN: CPT | Performed by: NURSE PRACTITIONER

## 2020-12-29 RX ORDER — AMLODIPINE BESYLATE 5 MG/1
5 TABLET ORAL DAILY
Qty: 30 TABLET | Refills: 5 | Status: SHIPPED | OUTPATIENT
Start: 2020-12-29 | End: 2021-01-28

## 2020-12-29 NOTE — PATIENT INSTRUCTIONS
Steps to Quit Smoking    Smoking tobacco can be bad for your health. It can also affect almost every organ in your body. Smoking puts you and people around you at risk for many serious long-lasting (chronic) diseases. Quitting smoking is hard, but it is one of the best things that you can do for your health. It is never too late to quit.  What are the benefits of quitting smoking?  When you quit smoking, you lower your risk for getting serious diseases and conditions. They can include:  · Lung cancer or lung disease.  · Heart disease.  · Stroke.  · Heart attack.  · Not being able to have children (infertility).  · Weak bones (osteoporosis) and broken bones (fractures).     If you have coughing, wheezing, and shortness of breath, those symptoms may get better when you quit. You may also get sick less often. If you are pregnant, quitting smoking can help to lower your chances of having a baby of low birth weight.  What can I do to help me quit smoking?  Talk with your doctor about what can help you quit smoking. Some things you can do (strategies) include:  · Quitting smoking totally, instead of slowly cutting back how much you smoke over a period of time.  · Going to in-person counseling. You are more likely to quit if you go to many counseling sessions.  · Using resources and support systems, such as:  ? Online chats with a counselor.  ? Phone quitlines.  ? Printed self-help materials.  ? Support groups or group counseling.  ? Text messaging programs.  ? Mobile phone apps or applications.  · Taking medicines. Some of these medicines may have nicotine in them. If you are pregnant or breastfeeding, do not take any medicines to quit smoking unless your doctor says it is okay. Talk with your doctor about counseling or other things that can help you.     Talk with your doctor about using more than one strategy at the same time, such as taking medicines while you are also going to in-person counseling. This can help make  quitting easier.  What things can I do to make it easier to quit?  Quitting smoking might feel very hard at first, but there is a lot that you can do to make it easier. Take these steps:  · Talk to your family and friends. Ask them to support and encourage you.  · Call phone quitlines, reach out to support groups, or work with a counselor.  · Ask people who smoke to not smoke around you.  · Avoid places that make you want (trigger) to smoke, such as:  ? Bars.  ? Parties.  ? Smoke-break areas at work.  · Spend time with people who do not smoke.  · Lower the stress in your life. Stress can make you want to smoke. Try these things to help your stress:  ? Getting regular exercise.  ? Deep-breathing exercises.  ? Yoga.  ? Meditating.  ? Doing a body scan. To do this, close your eyes, focus on one area of your body at a time from head to toe, and notice which parts of your body are tense. Try to relax the muscles in those areas.  · Download or buy apps on your mobile phone or tablet that can help you stick to your quit plan. There are many free apps, such as QuitGuide from the CDC (Centers for Disease Control and Prevention). You can find more support from smokefree.gov and other websites.     This information is not intended to replace advice given to you by your health care provider. Make sure you discuss any questions you have with your health care provider.  Document Released: 10/14/2010 Document Revised: 08/15/2017 Document Reviewed: 05/03/2016  Elsevier Interactive Patient Education © 2019 Elsevier Inc.       Mediterranean Diet  A Mediterranean diet refers to food and lifestyle choices that are based on the traditions of countries located on the [place]. This way of eating has been shown to help prevent certain conditions and improve outcomes for people who have chronic diseases, like kidney disease and heart disease.  What are tips for following this plan?  Lifestyle  · Cook and eat meals together with  your family, when possible.  · Drink enough fluid to keep your urine clear or pale yellow.  · Be physically active every day. This includes:  ? Aerobic exercise like running or swimming.  ? Leisure activities like gardening, walking, or housework.  · Get 7-8 hours of sleep each night.  · If recommended by your health care provider, drink red wine in moderation. This means 1 glass a day for nonpregnant women and 2 glasses a day for men. A glass of wine equals 5 oz (150 mL).  Reading food labels  · Check the serving size of packaged foods. For foods such as rice and pasta, the serving size refers to the amount of cooked product, not dry.  · Check the total fat in packaged foods. Avoid foods that have saturated fat or trans fats.  · Check the ingredients list for added sugars, such as corn syrup.  Shopping  · At the grocery store, buy most of your food from the areas near the walls of the store. This includes:  ? Fresh fruits and vegetables (produce).  ? Grains, beans, nuts, and seeds. Some of these may be available in unpackaged forms or large amounts (in bulk).  ? Fresh seafood.  ? Poultry and eggs.  ? Low-fat dairy products.  · Buy whole ingredients instead of prepackaged foods.  · Buy fresh fruits and vegetables in-season from local farmers markets.  · Buy frozen fruits and vegetables in resealable bags.  · If you do not have access to quality fresh seafood, buy precooked frozen shrimp or canned fish, such as tuna, salmon, or sardines.  · Buy small amounts of raw or cooked vegetables, salads, or olives from the deli or salad bar at your store.  · Stock your pantry so you always have certain foods on hand, such as olive oil, canned tuna, canned tomatoes, rice, pasta, and beans.  Cooking  · Cook foods with extra-virgin olive oil instead of using butter or other vegetable oils.  · Have meat as a side dish, and have vegetables or grains as your main dish. This means having meat in small portions or adding small amounts  of meat to foods like pasta or stew.  · Use beans or vegetables instead of meat in common dishes like chili or lasagna.  · [place] with different cooking methods. Try roasting or broiling vegetables instead of steaming or sautéeing them.  · Add frozen vegetables to soups, stews, pasta, or rice.  · Add nuts or seeds for added healthy fat at each meal. You can add these to yogurt, salads, or vegetable dishes.  · Marinate fish or vegetables using olive oil, lemon juice, garlic, and fresh herbs.  Meal planning  · Plan to eat 1 vegetarian meal one day each week. Try to work up to 2 vegetarian meals, if possible.  · Eat seafood 2 or more times a week.  · Have healthy snacks readily available, such as:  ? Vegetable sticks with hummus.  ? Greek yogurt.  ? Fruit and nut trail mix.  · Eat balanced meals throughout the week. This includes:  ? Fruit: 2-3 servings a day  ? Vegetables: 4-5 servings a day  ? Low-fat dairy: 2 servings a day  ? Fish, poultry, or lean meat: 1 serving a day  ? Beans and legumes: 2 or more servings a week  ? Nuts and seeds: 1-2 servings a day  ? Whole grains: 6-8 servings a day  ? Extra-virgin olive oil: 3-4 servings a day  · Limit red meat and sweets to only a few servings a month  What are my food choices?  · Mediterranean diet  ? Recommended  ? Grains: Whole-grain pasta. Brown rice. Bulgar wheat. Polenta. Couscous. Whole-wheat bread. Oatmeal. Quinoa.  ? Vegetables: Artichokes. Beets. Broccoli. Cabbage. Carrots. Eggplant. Green beans. Chard. Kale. Spinach. Onions. Leeks. Peas. Squash. Tomatoes. Peppers. Radishes.  ? Fruits: Apples. Apricots. Avocado. Berries. Bananas. Cherries. Dates. Figs. Grapes. [place]. Melon. Oranges. Peaches. Plums. Pomegranate.  ? Meats and other protein foods: Beans. Almonds. Sunflower seeds. Pine nuts. Peanuts. Cod. [place]. Scallops. Shrimp. Tuna. Tilapia. Clams. Oysters. Eggs.  ? Dairy: Low-fat milk. Cheese. Greek yogurt.  ? Beverages: Water. Red wine. Herbal tea.  ? Fats  and oils: Extra virgin olive oil. Avocado oil. Grape seed oil.  ? Sweets and desserts: Greek yogurt with honey. Baked apples. Poached pears. Trail mix.  ? Seasoning and other foods: Basil. Cilantro. Coriander. Cumin. Mint. Parsley. [place]. Rosemary. Tarragon. Garlic. Oregano. Thyme. Pepper. Balsalmic vinegar. Tahini. Hummus. Tomato sauce. Olives. Mushrooms.  ? Limit these  ? Grains: Prepackaged pasta or rice dishes. Prepackaged cereal with added sugar.  ? Vegetables: Deep fried potatoes (french fries).  ? Fruits: Fruit canned in syrup.  ? Meats and other protein foods: Beef. Pork. Lamb. Poultry with skin. Hot dogs. Strange.  ? Dairy: Ice cream. Sour cream. Whole milk.  ? Beverages: Juice. Sugar-sweetened soft drinks. Beer. Liquor and spirits.  ? Fats and oils: Butter. Canola oil. Vegetable oil. Beef fat (tallow). Lard.  ? Sweets and desserts: Cookies. Cakes. Pies. Candy.  ? Seasoning and other foods: Mayonnaise. Premade sauces and marinades.  ? The items listed may not be a complete list. Talk with your dietitian about what dietary choices are right for you.  Summary  · The Mediterranean diet includes both food and lifestyle choices.  · Eat a variety of fresh fruits and vegetables, beans, nuts, seeds, and whole grains.  · Limit the amount of red meat and sweets that you eat.  · Talk with your health care provider about whether it is safe for you to drink red wine in moderation. This means 1 glass a day for nonpregnant women and 2 glasses a day for men. A glass of wine equals 5 oz (150 mL).  This information is not intended to replace advice given to you by your health care provider. Make sure you discuss any questions you have with your health care provider.  Document Released: 08/10/2017 Document Revised: 09/12/2017 Document Reviewed: 08/10/2017  [employer] Interactive Patient Education © 2019 Elsevier Inc.

## 2020-12-29 NOTE — PROGRESS NOTES
Subjective     Chief Complaint: Hyperlipidemia and Hypertension    History of Present Illness   Claude E Campbell is a 55 y.o. male who presents with a past medical history significant for ASCVD, status post AMI on 3/5/2016 with PCI ABNER to the RCA, hypertension, dyslipidemia, and tobacco use.  He presents today for his regular follow-up.    Patient denies complaint of chest pain, palpitations, shortness of breath or lower extremity swelling.  He states that he has been doing well.  He reports medication compliance.  He does admit that his blood pressure has been high lately, reports that he has had increased stress at work.      Current Outpatient Medications:   •  atorvastatin (LIPITOR) 80 MG tablet, Take 1 tablet by mouth Daily., Disp: 90 tablet, Rfl: 1  •  carvedilol (COREG) 3.125 MG tablet, Take 1 tablet by mouth 2 (Two) Times a Day With Meals., Disp: 180 tablet, Rfl: 1  •  clopidogrel (PLAVIX) 75 MG tablet, Take 1 tablet by mouth Daily., Disp: 90 tablet, Rfl: 1  •  docusate sodium (COLACE) 100 MG capsule, Take 1 capsule by mouth Daily As Needed for Constipation., Disp: 90 capsule, Rfl: 2  •  fenofibrate (TRICOR) 48 MG tablet, Take 1 tablet by mouth Daily., Disp: 90 tablet, Rfl: 1  •  lisinopril (PRINIVIL,ZESTRIL) 20 MG tablet, Take 1 tablet by mouth Daily., Disp: 90 tablet, Rfl: 1  •  amLODIPine (NORVASC) 5 MG tablet, Take 1 tablet by mouth Daily for 30 days., Disp: 30 tablet, Rfl: 5     On this date:  The following portions of the patient's history were reviewed and updated as appropriate: allergies, current medications, past family history, past medical history, past social history, past surgical history and problem list.    Review of Systems   Constitution: Negative for malaise/fatigue.   Cardiovascular: Negative for chest pain, dyspnea on exertion, irregular heartbeat, leg swelling, near-syncope, orthopnea, palpitations, paroxysmal nocturnal dyspnea and syncope.   Respiratory: Negative for shortness of  "breath.    Hematologic/Lymphatic: Does not bruise/bleed easily.   Neurological: Negative for dizziness, light-headedness and weakness.         Objective     BP (!) 170/108 (BP Location: Left arm)   Pulse 64   Ht 185.4 cm (73\")   Wt 86.5 kg (190 lb 9.6 oz)   SpO2 98%   BMI 25.15 kg/m²     Physical Exam  Constitutional:       Appearance: Normal appearance. He is well-developed.   HENT:      Head: Normocephalic and atraumatic.   Eyes:      Pupils: Pupils are equal, round, and reactive to light.   Neck:      Vascular: No JVD.   Cardiovascular:      Rate and Rhythm: Normal rate and regular rhythm.      Heart sounds: No murmur. No friction rub. No gallop.    Pulmonary:      Effort: Pulmonary effort is normal. No respiratory distress.      Breath sounds: Normal breath sounds. No wheezing or rales.   Abdominal:      Palpations: Abdomen is soft. There is no mass.      Tenderness: There is no abdominal tenderness.      Hernia: No hernia is present.   Skin:     General: Skin is warm and dry.   Neurological:      Mental Status: He is alert and oriented to person, place, and time.   Psychiatric:         Mood and Affect: Mood normal.         Behavior: Behavior normal.         Procedures      Assessment/Plan       Diagnoses and all orders for this visit:    1. Coronary artery disease involving native coronary artery of native heart without angina pectoris (Primary)    2. Mixed hyperlipidemia    3. Essential hypertension  -     amLODIPine (NORVASC) 5 MG tablet; Take 1 tablet by mouth Daily for 30 days.  Dispense: 30 tablet; Refill: 5          Plan of care was reviewed.  Medication changes were made as noted below.  Patient agreed with plan of care.    The following tests were discussed with the patient: Lipid panel, CBC, CMP and Hemoglobin A1c, nuclear stress test and echocardiogram    Coronary artery disease is stable.  Patient is to continue Plavix, carvedilol, lisinopril, and atorvastatin.    With regard to dyslipidemia, " lipid panel of 6/24/2020 shows total cholesterol of 145, triglycerides 150 and LDL cholesterol 69.  Continue atorvastatin.  Continue fenofibrate.    With regard to hypertension, his blood pressure is elevated today and patient reports elevation at home as well.  We will add amlodipine 5 mg.  I have asked the patient to continue to monitor blood pressure and if his blood pressure does not reach goal of systolic readings in the 120s, he should notify this office.  We can adjust his medication from that.    Risk factor modification: Patient counseled regarding diet and exercise.  Patient encouraged to follow Mediterranean diet.  Handout given.  Patient counseled to participate in physical activity 30 minutes a day most days of the week.    Risk factor modification: Tobacco cessation counseling was given.  Patient advised regarding the correlation between cigarette smoking, smokeless tobacco use and coronary artery disease, as well as lung disease, cancer.  Patient was offered strategies to quit, including medication.  The patient is not interested in quitting.      Return in about 6 months (around 6/29/2021).      VISHAL Landeros

## 2021-03-04 ENCOUNTER — IMMUNIZATION (OUTPATIENT)
Dept: VACCINE CLINIC | Facility: HOSPITAL | Age: 56
End: 2021-03-04

## 2021-03-04 PROCEDURE — 91300 HC SARSCOV02 VAC 30MCG/0.3ML IM: CPT | Performed by: INTERNAL MEDICINE

## 2021-03-04 PROCEDURE — 0001A: CPT | Performed by: INTERNAL MEDICINE

## 2021-03-25 ENCOUNTER — IMMUNIZATION (OUTPATIENT)
Dept: VACCINE CLINIC | Facility: HOSPITAL | Age: 56
End: 2021-03-25

## 2021-03-25 PROCEDURE — 91300 HC SARSCOV02 VAC 30MCG/0.3ML IM: CPT | Performed by: INTERNAL MEDICINE

## 2021-03-25 PROCEDURE — 0002A: CPT | Performed by: INTERNAL MEDICINE

## 2021-06-30 ENCOUNTER — LAB (OUTPATIENT)
Dept: LAB | Facility: HOSPITAL | Age: 56
End: 2021-06-30

## 2021-06-30 ENCOUNTER — OFFICE VISIT (OUTPATIENT)
Dept: CARDIOLOGY | Facility: CLINIC | Age: 56
End: 2021-06-30

## 2021-06-30 VITALS
SYSTOLIC BLOOD PRESSURE: 123 MMHG | BODY MASS INDEX: 25.68 KG/M2 | WEIGHT: 189.6 LBS | HEART RATE: 69 BPM | OXYGEN SATURATION: 98 % | DIASTOLIC BLOOD PRESSURE: 74 MMHG | HEIGHT: 72 IN

## 2021-06-30 DIAGNOSIS — I10 ESSENTIAL HYPERTENSION: Chronic | ICD-10-CM

## 2021-06-30 DIAGNOSIS — E78.2 MIXED HYPERLIPIDEMIA: Chronic | ICD-10-CM

## 2021-06-30 DIAGNOSIS — I25.10 CORONARY ARTERY DISEASE INVOLVING NATIVE CORONARY ARTERY OF NATIVE HEART WITHOUT ANGINA PECTORIS: Primary | Chronic | ICD-10-CM

## 2021-06-30 DIAGNOSIS — Z72.0 TOBACCO USE: ICD-10-CM

## 2021-06-30 LAB
25(OH)D3 SERPL-MCNC: 24.9 NG/ML
ALBUMIN SERPL-MCNC: 4.4 G/DL (ref 3.5–5.2)
ALBUMIN/GLOB SERPL: 1.5 G/DL
ALP SERPL-CCNC: 57 U/L (ref 39–117)
ALT SERPL W P-5'-P-CCNC: 37 U/L (ref 1–41)
ANION GAP SERPL CALCULATED.3IONS-SCNC: 9.3 MMOL/L (ref 5–15)
AST SERPL-CCNC: 32 U/L (ref 1–40)
BILIRUB SERPL-MCNC: <0.2 MG/DL (ref 0–1.2)
BUN SERPL-MCNC: 15 MG/DL (ref 6–20)
BUN/CREAT SERPL: 18.5 (ref 7–25)
CALCIUM SPEC-SCNC: 8.9 MG/DL (ref 8.6–10.5)
CHLORIDE SERPL-SCNC: 104 MMOL/L (ref 98–107)
CHOLEST SERPL-MCNC: 156 MG/DL (ref 0–200)
CO2 SERPL-SCNC: 22.7 MMOL/L (ref 22–29)
CREAT SERPL-MCNC: 0.81 MG/DL (ref 0.76–1.27)
DEPRECATED RDW RBC AUTO: 46.4 FL (ref 37–54)
ERYTHROCYTE [DISTWIDTH] IN BLOOD BY AUTOMATED COUNT: 13.1 % (ref 12.3–15.4)
GFR SERPL CREATININE-BSD FRML MDRD: 98 ML/MIN/1.73
GLOBULIN UR ELPH-MCNC: 2.9 GM/DL
GLUCOSE SERPL-MCNC: 114 MG/DL (ref 65–99)
HCT VFR BLD AUTO: 40.9 % (ref 37.5–51)
HDLC SERPL-MCNC: 34 MG/DL (ref 40–60)
HGB BLD-MCNC: 13.9 G/DL (ref 13–17.7)
LDLC SERPL CALC-MCNC: 50 MG/DL (ref 0–100)
LDLC/HDLC SERPL: 0.73 {RATIO}
MCH RBC QN AUTO: 32.3 PG (ref 26.6–33)
MCHC RBC AUTO-ENTMCNC: 34 G/DL (ref 31.5–35.7)
MCV RBC AUTO: 95.1 FL (ref 79–97)
PLATELET # BLD AUTO: 286 10*3/MM3 (ref 140–450)
PMV BLD AUTO: 9.9 FL (ref 6–12)
POTASSIUM SERPL-SCNC: 4.9 MMOL/L (ref 3.5–5.2)
PROT SERPL-MCNC: 7.3 G/DL (ref 6–8.5)
RBC # BLD AUTO: 4.3 10*6/MM3 (ref 4.14–5.8)
SODIUM SERPL-SCNC: 136 MMOL/L (ref 136–145)
TRIGL SERPL-MCNC: 486 MG/DL (ref 0–150)
TSH SERPL DL<=0.05 MIU/L-ACNC: 1.87 UIU/ML (ref 0.27–4.2)
VLDLC SERPL-MCNC: 72 MG/DL (ref 5–40)
WBC # BLD AUTO: 8.46 10*3/MM3 (ref 3.4–10.8)

## 2021-06-30 PROCEDURE — 82306 VITAMIN D 25 HYDROXY: CPT | Performed by: NURSE PRACTITIONER

## 2021-06-30 PROCEDURE — 80061 LIPID PANEL: CPT | Performed by: NURSE PRACTITIONER

## 2021-06-30 PROCEDURE — 99213 OFFICE O/P EST LOW 20 MIN: CPT | Performed by: NURSE PRACTITIONER

## 2021-06-30 PROCEDURE — 36415 COLL VENOUS BLD VENIPUNCTURE: CPT | Performed by: NURSE PRACTITIONER

## 2021-06-30 PROCEDURE — 80050 GENERAL HEALTH PANEL: CPT | Performed by: NURSE PRACTITIONER

## 2021-06-30 RX ORDER — LISINOPRIL 20 MG/1
20 TABLET ORAL DAILY
Qty: 90 TABLET | Refills: 3 | Status: SHIPPED | OUTPATIENT
Start: 2021-06-30 | End: 2022-01-11 | Stop reason: SDUPTHER

## 2021-06-30 RX ORDER — ATORVASTATIN CALCIUM 80 MG/1
80 TABLET, FILM COATED ORAL DAILY
Qty: 90 TABLET | Refills: 1 | Status: SHIPPED | OUTPATIENT
Start: 2021-06-30 | End: 2022-01-11 | Stop reason: SDUPTHER

## 2021-06-30 RX ORDER — CARVEDILOL 3.12 MG/1
3.12 TABLET ORAL 2 TIMES DAILY WITH MEALS
Qty: 180 TABLET | Refills: 3 | Status: SHIPPED | OUTPATIENT
Start: 2021-06-30 | End: 2022-01-11 | Stop reason: SDUPTHER

## 2021-06-30 RX ORDER — AMLODIPINE BESYLATE 5 MG/1
5 TABLET ORAL DAILY
COMMUNITY
Start: 2021-06-26 | End: 2021-06-30 | Stop reason: SDUPTHER

## 2021-06-30 RX ORDER — AMLODIPINE BESYLATE 5 MG/1
5 TABLET ORAL DAILY
Qty: 90 TABLET | Refills: 3 | Status: SHIPPED | OUTPATIENT
Start: 2021-06-30 | End: 2022-01-11 | Stop reason: SDUPTHER

## 2021-06-30 RX ORDER — FENOFIBRATE 48 MG/1
48 TABLET, COATED ORAL DAILY
Qty: 90 TABLET | Refills: 3 | Status: SHIPPED | OUTPATIENT
Start: 2021-06-30 | End: 2022-01-11 | Stop reason: SDUPTHER

## 2021-06-30 RX ORDER — CLOPIDOGREL BISULFATE 75 MG/1
75 TABLET ORAL DAILY
Qty: 90 TABLET | Refills: 3 | Status: SHIPPED | OUTPATIENT
Start: 2021-06-30 | End: 2022-01-11 | Stop reason: SDUPTHER

## 2021-06-30 NOTE — PROGRESS NOTES
"Chief Complaint  Coronary Artery Disease, Hypertension, and Hyperlipidemia    Subjective          Claude E Campbell presents to [facility] for follow-up.    History of Present Illness    Patient denies chest pain, palpitations, shortness of breath and dyspnea on exertion.  He states he has been doing well since he was last seen.  Reports medication compliance.    He has continued to smoke and does chew tobacco at work during the day.    Objective     Vital Signs:   /74 (BP Location: Left arm)   Pulse 69   Ht 182.9 cm (72\")   Wt 86 kg (189 lb 9.6 oz)   SpO2 98%   BMI 25.71 kg/m²       Physical Exam  Constitutional:       Appearance: Normal appearance. He is well-developed.   Cardiovascular:      Rate and Rhythm: Normal rate and regular rhythm.      Heart sounds: No murmur heard.   No friction rub. No gallop.    Pulmonary:      Effort: Pulmonary effort is normal. No respiratory distress.      Breath sounds: Normal breath sounds. No wheezing or rales.   Skin:     General: Skin is warm and dry.   Neurological:      Mental Status: He is alert and oriented to person, place, and time.   Psychiatric:         Mood and Affect: Mood normal.         Behavior: Behavior normal.          Result Review :                Current Outpatient Medications   Medication Sig Dispense Refill   • amLODIPine (NORVASC) 5 MG tablet Take 1 tablet by mouth Daily. for blood pressure 90 tablet 3   • atorvastatin (LIPITOR) 80 MG tablet Take 1 tablet by mouth Daily. 90 tablet 1   • carvedilol (COREG) 3.125 MG tablet Take 1 tablet by mouth 2 (Two) Times a Day With Meals. 180 tablet 3   • clopidogrel (PLAVIX) 75 MG tablet Take 1 tablet by mouth Daily. 90 tablet 3   • docusate sodium (COLACE) 100 MG capsule Take 1 capsule by mouth Daily As Needed for Constipation. 90 capsule 2   • fenofibrate (TRICOR) 48 MG tablet Take 1 tablet by mouth Daily. 90 tablet 3   • lisinopril (PRINIVIL,ZESTRIL) 20 MG tablet Take 1 tablet " by mouth Daily. 90 tablet 3     No current facility-administered medications for this visit.            Assessment and Plan    Problem List Items Addressed This Visit        Cardiac and Vasculature    Coronary artery disease  - Primary (Chronic)    Overview     · 3/5/2016 [facility] for MI: PCI ABNER to the RCA         Relevant Medications    clopidogrel (PLAVIX) 75 MG tablet    carvedilol (COREG) 3.125 MG tablet    amLODIPine (NORVASC) 5 MG tablet    Other Relevant Orders    Comprehensive Metabolic Panel    CBC (No Diff)    Lipid Panel    TSH    Vitamin D 25 Hydroxy    Mixed hyperlipidemia (Chronic)    Overview     · 6/24/2020 total cholesterol 145, triglycerides 150, HDL 46, and LDL 69         Relevant Medications    atorvastatin (LIPITOR) 80 MG tablet    fenofibrate (TRICOR) 48 MG tablet    Essential hypertension (Chronic)    Relevant Medications    carvedilol (COREG) 3.125 MG tablet    lisinopril (PRINIVIL,ZESTRIL) 20 MG tablet    amLODIPine (NORVASC) 5 MG tablet       Tobacco    Tobacco use (Chronic)              Follow Up     Medications were reviewed with the patient.  No changes were made.    Patient has not had any recent labs drawn.  Will order today.    Patient to continue Plavix, carvedilol, amlodipine, lisinopril, and atorvastatin.  Continue fenofibrate.    Risk factor modification: Smoking cessation counseling was given.  Patient advised regarding the correlation between chewing tobacco, cigarette smoking and coronary artery disease, as well as lung disease, cancer.  Patient was offered strategies to quit, including medication.  The patient is not interested in quitting.    Return in about 6 months (around 12/30/2021).    Patient was given instructions and counseling regarding his condition or for health maintenance advice. Please see specific information pulled into the AVS if appropriate.

## 2021-07-02 ENCOUNTER — TELEPHONE (OUTPATIENT)
Dept: CARDIOLOGY | Facility: CLINIC | Age: 56
End: 2021-07-02

## 2021-07-02 DIAGNOSIS — E55.9 VITAMIN D DEFICIENCY: Primary | ICD-10-CM

## 2021-07-02 DIAGNOSIS — E78.1 HYPERTRIGLYCERIDEMIA: ICD-10-CM

## 2021-07-02 RX ORDER — ICOSAPENT ETHYL 1000 MG/1
2 CAPSULE ORAL 2 TIMES DAILY WITH MEALS
Qty: 120 CAPSULE | Refills: 6 | Status: SHIPPED | OUTPATIENT
Start: 2021-07-02 | End: 2022-01-11 | Stop reason: SDUPTHER

## 2021-07-02 RX ORDER — ERGOCALCIFEROL 1.25 MG/1
50000 CAPSULE ORAL WEEKLY
Qty: 5 CAPSULE | Refills: 2 | Status: SHIPPED | OUTPATIENT
Start: 2021-07-02

## 2021-07-02 NOTE — TELEPHONE ENCOUNTER
----- Message from VISHAL Keys sent at 7/2/2021  8:45 AM EDT -----  Please call patient.  Blood work has been returned to the office.  Your vitamin D level is low.  I have sent a prescription in for vitamin D supplementation.  The medication is to be taken on a weekly basis for approximately 3 months.  We will retest at the end of those 3 months.  Your thyroid hormone was normal.  Your comprehensive metabolic panel shows good kidney and liver function.  Your glucose was just slightly elevated at 114.  Your lipid panel showed very high triglycerides at 486.  This is higher than we have seen it previously.  Are you taking your atorvastatin and fenofibrate.  I will want to add an additional medication for those triglycerides.  The medication I am adding is a prescription strength omega-3 fatty acid.  Sometimes this medication is not covered very well with insurance.  If you get to the pharmacy and the cost is exorbitant please call this office and we will change the prescription.  Alternately, if necessary, we can help you fill out paperwork to get assistance from the pharmaceutical company.Meds were sent to [facility].  Please verify his pharmacy.    Thanks, Opal

## 2021-07-14 ENCOUNTER — TELEPHONE (OUTPATIENT)
Dept: CARDIOLOGY | Facility: CLINIC | Age: 56
End: 2021-07-14

## 2021-07-14 NOTE — TELEPHONE ENCOUNTER
----- Message from VISHAL Keys sent at 7/2/2021  8:45 AM EDT -----  Please call patient.  Blood work has been returned to the office.  Your vitamin D level is low.  I have sent a prescription in for vitamin D supplementation.  The medication is to be taken on a weekly basis for approximately 3 months.  We will retest at the end of those 3 months.  Your thyroid hormone was normal.  Your comprehensive metabolic panel shows good kidney and liver function.  Your glucose was just slightly elevated at 114.  Your lipid panel showed very high triglycerides at 486.  This is higher than we have seen it previously.  Are you taking your atorvastatin and fenofibrate.  I will want to add an additional medication for those triglycerides.  The medication I am adding is a prescription strength omega-3 fatty acid.  Sometimes this medication is not covered very well with insurance.  If you get to the pharmacy and the cost is exorbitant please call this office and we will change the prescription.  Alternately, if necessary, we can help you fill out paperwork to get assistance from the pharmaceutical company.Meds were sent to [facility].  Please verify his pharmacy.    Thanks, Opal

## 2021-07-14 NOTE — TELEPHONE ENCOUNTER
Per Opal Ho gave patient the below results. Patient stated he would call the pharmacy and call me back tomorrow to go over the amount.

## 2021-07-16 ENCOUNTER — TELEPHONE (OUTPATIENT)
Dept: CARDIOLOGY | Facility: CLINIC | Age: 56
End: 2021-07-16

## 2021-07-16 NOTE — TELEPHONE ENCOUNTER
INFORMED THE PATIENT THAT MARKY, CALLED IN ANOTHER OMEGA-3 FOR THE PATIENT.    Patient's wife called and would like to have a different omega-3 called into the pharmacy, because the Vascepa was to expensive.

## 2021-07-19 PROBLEM — E78.1 HYPERTRIGLYCERIDEMIA: Status: ACTIVE | Noted: 2021-07-19

## 2021-07-20 RX ORDER — CHLORAL HYDRATE 500 MG
2000 CAPSULE ORAL 2 TIMES DAILY WITH MEALS
Qty: 120 EACH | Refills: 11 | Status: SHIPPED | OUTPATIENT
Start: 2021-07-20 | End: 2023-01-19 | Stop reason: SDUPTHER

## 2022-01-11 ENCOUNTER — LAB (OUTPATIENT)
Dept: LAB | Facility: HOSPITAL | Age: 57
End: 2022-01-11

## 2022-01-11 ENCOUNTER — OFFICE VISIT (OUTPATIENT)
Dept: CARDIOLOGY | Facility: CLINIC | Age: 57
End: 2022-01-11

## 2022-01-11 VITALS
HEART RATE: 77 BPM | BODY MASS INDEX: 27.09 KG/M2 | WEIGHT: 200 LBS | OXYGEN SATURATION: 98 % | HEIGHT: 72 IN | SYSTOLIC BLOOD PRESSURE: 128 MMHG | DIASTOLIC BLOOD PRESSURE: 83 MMHG

## 2022-01-11 DIAGNOSIS — I25.10 CORONARY ARTERY DISEASE INVOLVING NATIVE CORONARY ARTERY OF NATIVE HEART WITHOUT ANGINA PECTORIS: ICD-10-CM

## 2022-01-11 DIAGNOSIS — I10 ESSENTIAL HYPERTENSION: ICD-10-CM

## 2022-01-11 DIAGNOSIS — E78.2 MIXED HYPERLIPIDEMIA: Chronic | ICD-10-CM

## 2022-01-11 DIAGNOSIS — E78.1 HYPERTRIGLYCERIDEMIA: Primary | ICD-10-CM

## 2022-01-11 LAB
ALBUMIN SERPL-MCNC: 4.2 G/DL (ref 3.5–5.2)
ALBUMIN/GLOB SERPL: 1.4 G/DL
ALP SERPL-CCNC: 64 U/L (ref 39–117)
ALT SERPL W P-5'-P-CCNC: 33 U/L (ref 1–41)
ANION GAP SERPL CALCULATED.3IONS-SCNC: 7.9 MMOL/L (ref 5–15)
AST SERPL-CCNC: 30 U/L (ref 1–40)
BILIRUB SERPL-MCNC: 0.2 MG/DL (ref 0–1.2)
BUN SERPL-MCNC: 13 MG/DL (ref 6–20)
BUN/CREAT SERPL: 17.6 (ref 7–25)
CALCIUM SPEC-SCNC: 9.1 MG/DL (ref 8.6–10.5)
CHLORIDE SERPL-SCNC: 105 MMOL/L (ref 98–107)
CHOLEST SERPL-MCNC: 163 MG/DL (ref 0–200)
CO2 SERPL-SCNC: 23.1 MMOL/L (ref 22–29)
CREAT SERPL-MCNC: 0.74 MG/DL (ref 0.76–1.27)
DEPRECATED RDW RBC AUTO: 41.8 FL (ref 37–54)
ERYTHROCYTE [DISTWIDTH] IN BLOOD BY AUTOMATED COUNT: 12.4 % (ref 12.3–15.4)
GFR SERPL CREATININE-BSD FRML MDRD: 109 ML/MIN/1.73
GLOBULIN UR ELPH-MCNC: 2.9 GM/DL
GLUCOSE SERPL-MCNC: 114 MG/DL (ref 65–99)
HCT VFR BLD AUTO: 40.1 % (ref 37.5–51)
HDLC SERPL-MCNC: 36 MG/DL (ref 40–60)
HGB BLD-MCNC: 13.8 G/DL (ref 13–17.7)
LDLC SERPL CALC-MCNC: 80 MG/DL (ref 0–100)
LDLC/HDLC SERPL: 1.93 {RATIO}
MCH RBC QN AUTO: 31.9 PG (ref 26.6–33)
MCHC RBC AUTO-ENTMCNC: 34.4 G/DL (ref 31.5–35.7)
MCV RBC AUTO: 92.8 FL (ref 79–97)
PLATELET # BLD AUTO: 287 10*3/MM3 (ref 140–450)
PMV BLD AUTO: 10.3 FL (ref 6–12)
POTASSIUM SERPL-SCNC: 4.6 MMOL/L (ref 3.5–5.2)
PROT SERPL-MCNC: 7.1 G/DL (ref 6–8.5)
RBC # BLD AUTO: 4.32 10*6/MM3 (ref 4.14–5.8)
SODIUM SERPL-SCNC: 136 MMOL/L (ref 136–145)
TRIGL SERPL-MCNC: 288 MG/DL (ref 0–150)
VLDLC SERPL-MCNC: 47 MG/DL (ref 5–40)
WBC NRBC COR # BLD: 7.94 10*3/MM3 (ref 3.4–10.8)

## 2022-01-11 PROCEDURE — 99214 OFFICE O/P EST MOD 30 MIN: CPT | Performed by: NURSE PRACTITIONER

## 2022-01-11 PROCEDURE — 80053 COMPREHEN METABOLIC PANEL: CPT | Performed by: NURSE PRACTITIONER

## 2022-01-11 PROCEDURE — 36415 COLL VENOUS BLD VENIPUNCTURE: CPT | Performed by: NURSE PRACTITIONER

## 2022-01-11 PROCEDURE — 85027 COMPLETE CBC AUTOMATED: CPT | Performed by: NURSE PRACTITIONER

## 2022-01-11 PROCEDURE — 80061 LIPID PANEL: CPT | Performed by: NURSE PRACTITIONER

## 2022-01-11 RX ORDER — CARVEDILOL 3.12 MG/1
3.12 TABLET ORAL 2 TIMES DAILY WITH MEALS
Qty: 180 TABLET | Refills: 2 | Status: SHIPPED | OUTPATIENT
Start: 2022-01-11 | End: 2023-01-19 | Stop reason: SDUPTHER

## 2022-01-11 RX ORDER — LISINOPRIL 20 MG/1
20 TABLET ORAL DAILY
Qty: 90 TABLET | Refills: 2 | Status: SHIPPED | OUTPATIENT
Start: 2022-01-11 | End: 2023-01-19 | Stop reason: SDUPTHER

## 2022-01-11 RX ORDER — ATORVASTATIN CALCIUM 80 MG/1
80 TABLET, FILM COATED ORAL DAILY
Qty: 90 TABLET | Refills: 2 | Status: SHIPPED | OUTPATIENT
Start: 2022-01-11 | End: 2023-01-03 | Stop reason: SDUPTHER

## 2022-01-11 RX ORDER — AMLODIPINE BESYLATE 5 MG/1
5 TABLET ORAL DAILY
Qty: 90 TABLET | Refills: 2 | Status: SHIPPED | OUTPATIENT
Start: 2022-01-11 | End: 2023-01-19 | Stop reason: SDUPTHER

## 2022-01-11 RX ORDER — FENOFIBRATE 48 MG/1
48 TABLET, COATED ORAL DAILY
Qty: 90 TABLET | Refills: 2 | Status: SHIPPED | OUTPATIENT
Start: 2022-01-11 | End: 2023-01-19 | Stop reason: SDUPTHER

## 2022-01-11 RX ORDER — CLOPIDOGREL BISULFATE 75 MG/1
75 TABLET ORAL DAILY
Qty: 90 TABLET | Refills: 3 | Status: SHIPPED | OUTPATIENT
Start: 2022-01-11 | End: 2023-01-19 | Stop reason: SDUPTHER

## 2022-01-11 RX ORDER — OMEGA-3-ACID ETHYL ESTERS 1 G/1
2 CAPSULE, LIQUID FILLED ORAL 2 TIMES DAILY
Qty: 360 CAPSULE | Refills: 2 | Status: SHIPPED | OUTPATIENT
Start: 2022-01-11 | End: 2023-01-19 | Stop reason: SDUPTHER

## 2022-01-11 NOTE — PROGRESS NOTES
"Chief Complaint  Follow-up    Subjective          Claude E Campbell presents to [facility] CARDIOLOGY for follow-up.    History of Present Illness    Mr. Clement was last seen in clinic on 6/30/2021.  CAD, hypertension were stable.  Labs were ordered at that visit, and triglycerides were noted to be 486.  Patient was started on omega-3's.    At today's visit Mr. Clement reports that he has been doing well.  He denies any chest pain or palpitations.  He denies shortness of breath or dyspnea on exertion.  He denies any PND and orthopnea.  He denies bruising easily.  He denies any bright red blood per rectum or black tarry stools.  He reports medication compliance.    Objective     Vital Signs:   /83 (BP Location: Right arm, Patient Position: Sitting, Cuff Size: Adult)   Pulse 77   Ht 182.9 cm (72\")   Wt 90.7 kg (200 lb)   SpO2 98%   BMI 27.12 kg/m²       Physical Exam  Vitals reviewed.   Constitutional:       Appearance: Normal appearance. He is well-developed.   Cardiovascular:      Rate and Rhythm: Normal rate and regular rhythm.      Heart sounds: No murmur heard.  No friction rub. No gallop.    Pulmonary:      Effort: Pulmonary effort is normal. No respiratory distress.      Breath sounds: Wheezing present. No rales.   Skin:     General: Skin is warm and dry.   Neurological:      Mental Status: He is alert and oriented to person, place, and time.   Psychiatric:         Mood and Affect: Mood normal.         Behavior: Behavior normal.          Result Review :                Current Outpatient Medications   Medication Sig Dispense Refill   • amLODIPine (NORVASC) 5 MG tablet Take 1 tablet by mouth Daily. for blood pressure 90 tablet 2   • atorvastatin (LIPITOR) 80 MG tablet Take 1 tablet by mouth Daily. 90 tablet 2   • carvedilol (COREG) 3.125 MG tablet Take 1 tablet by mouth 2 (Two) Times a Day With Meals. 180 tablet 2   • clopidogrel (PLAVIX) 75 MG tablet Take 1 tablet by mouth Daily. " 90 tablet 3   • fenofibrate (TRICOR) 48 MG tablet Take 1 tablet by mouth Daily. 90 tablet 2   • lisinopril (PRINIVIL,ZESTRIL) 20 MG tablet Take 1 tablet by mouth Daily. 90 tablet 2   • Omega-3 Fatty Acids (fish oil) 1000 MG capsule capsule Take 2 capsules by mouth 2 (Two) Times a Day With Meals. 120 each 11   • vitamin D (ERGOCALCIFEROL) 1.25 MG (78891 UT) capsule capsule Take 1 capsule by mouth 1 (One) Time Per Week. 5 capsule 2   • docusate sodium (COLACE) 100 MG capsule Take 1 capsule by mouth Daily As Needed for Constipation. 90 capsule 2   • omega-3 acid ethyl esters (LOVAZA) 1 g capsule Take 2 capsules by mouth 2 (Two) Times a Day. 360 capsule 2     No current facility-administered medications for this visit.            Assessment and Plan    Problem List Items Addressed This Visit        Cardiac and Vasculature    Coronary artery disease  (Chronic)    Overview     · 3/5/2016 [facility] for MI: PCI ABNER to the RCA  · 6/4/2020 nuclear stress test: Myocardial perfusion imaging indicates an infarct in the inferior wall and lateral wall with no significant ischemia  · 6/23/2020 TTE: LVEF 51 to 55%, mild TR, mild concentric hypertrophy         Relevant Medications    amLODIPine (NORVASC) 5 MG tablet    carvedilol (COREG) 3.125 MG tablet    clopidogrel (PLAVIX) 75 MG tablet    Other Relevant Orders    Comprehensive Metabolic Panel (Completed)    CBC (No Diff) (Completed)    Mixed hyperlipidemia (Chronic)    Overview     · 6/24/2020 total cholesterol 145, triglycerides 150, HDL 46, and LDL 69  · 6/30/2021 total cholesterol 156, triglycerides 486, HDL 34, and LDL 50  · 1/11/2022 total cholesterol 163, triglycerides 288, HDL 36, and LDL 80         Relevant Medications    omega-3 acid ethyl esters (LOVAZA) 1 g capsule    atorvastatin (LIPITOR) 80 MG tablet    fenofibrate (TRICOR) 48 MG tablet    Essential hypertension (Chronic)    Relevant Medications    amLODIPine (NORVASC) 5 MG tablet    carvedilol (COREG) 3.125 MG tablet     lisinopril (PRINIVIL,ZESTRIL) 20 MG tablet    Other Relevant Orders    Comprehensive Metabolic Panel (Completed)    CBC (No Diff) (Completed)    Hypertriglyceridemia - Primary (Chronic)    Relevant Medications    omega-3 acid ethyl esters (LOVAZA) 1 g capsule    atorvastatin (LIPITOR) 80 MG tablet    fenofibrate (TRICOR) 48 MG tablet    Other Relevant Orders    Lipid Panel (Completed)    Comprehensive Metabolic Panel (Completed)    CBC (No Diff) (Completed)              Follow Up     Medications were reviewed with the patient.  No changes were made.    CAD is stable.  Patient is to continue GDMT including Plavix, lisinopril, carvedilol, and atorvastatin.    Hypertension is controlled.  Continue amlodipine    With regard to dyslipidemia, patient will have labs drawn today.  Continue omega-3's, fenofibrate, and atorvastatin.    Risk factor modification: Smoking cessation counseling was given.  Patient advised regarding the correlation between cigarette smoking, smokeless tobacco and coronary artery disease, as well as lung disease, cancer.  Patient was offered strategies to quit, including medication.  The patient is not ready to quit.      Return in about 6 months (around 7/11/2022).    Patient was given instructions and counseling regarding his condition or for health maintenance advice. Please see specific information pulled into the AVS if appropriate.

## 2022-01-13 ENCOUNTER — TELEPHONE (OUTPATIENT)
Dept: CARDIOLOGY | Facility: CLINIC | Age: 57
End: 2022-01-13

## 2022-01-13 PROBLEM — E78.1 HYPERTRIGLYCERIDEMIA: Chronic | Status: ACTIVE | Noted: 2021-07-19

## 2022-01-13 NOTE — TELEPHONE ENCOUNTER
----- Message from VISHAL Keys sent at 1/12/2022  4:23 PM EST -----  Please call Mr. Clement.  His complete blood count is normal.  Comprehensive metabolic panel shows good kidney and liver function.  Glucose was mildly elevated at 114.  This is close to what it has been in the last 2 years.  Lipid panel is abnormal.  Please verify with the patient that he is taking atorvastatin, fenofibrate, and omega-3 fatty acids.  Triglycerides were 288, the goal is for them to be less than 150.  If he is taking all of the above medications his triglycerides ought to improve.  However they do not seem to be getting much better, so I would recommend that he eliminate simple carbohydrates such as white bread, pasta, and desserts.  LDL cholesterol is to be less than 70, his is 80.  This can be improved by increasing consumption of fresh vegetables and fruit, and eliminating red meat.If Mr. Clement is not taking his atorvastatin, fenofibrate and omega-3 fatty acids, please tell him to start.Thanks,

## 2022-01-13 NOTE — TELEPHONE ENCOUNTER
Spoke with patients wife. She states he is taking all of his medicine & she will tell him to stop eating bread etc. She v/u

## 2022-07-15 ENCOUNTER — OFFICE VISIT (OUTPATIENT)
Dept: CARDIOLOGY | Facility: CLINIC | Age: 57
End: 2022-07-15

## 2022-07-15 VITALS
SYSTOLIC BLOOD PRESSURE: 118 MMHG | BODY MASS INDEX: 25.3 KG/M2 | HEIGHT: 72 IN | OXYGEN SATURATION: 98 % | WEIGHT: 186.8 LBS | DIASTOLIC BLOOD PRESSURE: 76 MMHG | HEART RATE: 55 BPM

## 2022-07-15 DIAGNOSIS — E78.1 HYPERTRIGLYCERIDEMIA: Chronic | ICD-10-CM

## 2022-07-15 DIAGNOSIS — I25.10 CORONARY ARTERY DISEASE INVOLVING NATIVE CORONARY ARTERY OF NATIVE HEART WITHOUT ANGINA PECTORIS: Primary | Chronic | ICD-10-CM

## 2022-07-15 DIAGNOSIS — I10 ESSENTIAL HYPERTENSION: Chronic | ICD-10-CM

## 2022-07-15 PROCEDURE — 99214 OFFICE O/P EST MOD 30 MIN: CPT | Performed by: NURSE PRACTITIONER

## 2022-07-15 RX ORDER — TADALAFIL 10 MG/1
10 TABLET ORAL DAILY PRN
Qty: 20 TABLET | Refills: 11 | Status: SHIPPED | OUTPATIENT
Start: 2022-07-15

## 2022-07-15 NOTE — PROGRESS NOTES
"Chief Complaint  Coronary Artery Disease (Patient here for 6 mo follow up/ patient denies chest pain or shortness of breath at this time . )    Subjective          Claude E Campbell presents to [facility] CARDIOLOGY for follow-up.    History of Present Illness    Mr. Clement was last seen in clinic on 1/11/2022.  Patient was doing well and no changes were made to his medications.  However his labs were ordered.    Mr. Clement denies chest pain.  He denies palpitations, shortness of breath, dyspnea on exertion.  He reports medication compliance.    Mr. Clement did ask me today if it would be safe for him to take erectile dysfunction medication.    Objective     Vital Signs:   /76 (BP Location: Left arm, Patient Position: Sitting, Cuff Size: Large Adult)   Pulse 55   Ht 182.9 cm (72\")   Wt 84.7 kg (186 lb 12.8 oz)   SpO2 98%   BMI 25.33 kg/m²       Physical Exam  Vitals reviewed.   Constitutional:       Appearance: Normal appearance. He is well-developed.   Cardiovascular:      Rate and Rhythm: Normal rate and regular rhythm.      Heart sounds: No murmur heard.    No friction rub. No gallop.   Pulmonary:      Effort: Pulmonary effort is normal. No respiratory distress.      Breath sounds: Normal breath sounds. No wheezing or rales.   Skin:     General: Skin is warm and dry.   Neurological:      Mental Status: He is alert and oriented to person, place, and time.   Psychiatric:         Mood and Affect: Mood normal.         Behavior: Behavior normal.          Result Review :     CMP    CMP 1/11/22   Glucose 114 (A)   BUN 13   Creatinine 0.74 (A)   eGFR Non African Am 109   Sodium 136   Potassium 4.6   Chloride 105   Calcium 9.1   Albumin 4.20   Total Bilirubin 0.2   Alkaline Phosphatase 64   AST (SGOT) 30   ALT (SGPT) 33   (A) Abnormal value       Comments are available for some flowsheets but are not being displayed.           CBC    CBC 1/11/22   WBC 7.94   RBC 4.32   Hemoglobin 13.8 "   Hematocrit 40.1   MCV 92.8   MCH 31.9   MCHC 34.4   RDW 12.4   Platelets 287           Lipid Panel    Lipid Panel 1/11/22   Total Cholesterol 163   Triglycerides 288 (A)   HDL Cholesterol 36 (A)   VLDL Cholesterol 47 (A)   LDL Cholesterol  80   LDL/HDL Ratio 1.93   (A) Abnormal value                     Current Outpatient Medications   Medication Sig Dispense Refill   • amLODIPine (NORVASC) 5 MG tablet Take 1 tablet by mouth Daily. for blood pressure 90 tablet 2   • atorvastatin (LIPITOR) 80 MG tablet Take 1 tablet by mouth Daily. 90 tablet 2   • carvedilol (COREG) 3.125 MG tablet Take 1 tablet by mouth 2 (Two) Times a Day With Meals. 180 tablet 2   • clopidogrel (PLAVIX) 75 MG tablet Take 1 tablet by mouth Daily. 90 tablet 3   • docusate sodium (COLACE) 100 MG capsule Take 1 capsule by mouth Daily As Needed for Constipation. 90 capsule 2   • fenofibrate (TRICOR) 48 MG tablet Take 1 tablet by mouth Daily. 90 tablet 2   • lisinopril (PRINIVIL,ZESTRIL) 20 MG tablet Take 1 tablet by mouth Daily. 90 tablet 2   • omega-3 acid ethyl esters (LOVAZA) 1 g capsule Take 2 capsules by mouth 2 (Two) Times a Day. 360 capsule 2   • vitamin D (ERGOCALCIFEROL) 1.25 MG (77564 UT) capsule capsule Take 1 capsule by mouth 1 (One) Time Per Week. 5 capsule 2   • Omega-3 Fatty Acids (fish oil) 1000 MG capsule capsule Take 2 capsules by mouth 2 (Two) Times a Day With Meals. 120 each 11   • tadalafil (Cialis) 10 MG tablet Take 1 tablet by mouth Daily As Needed for Erectile Dysfunction. Do not take more than 2 tabs in 24 hour period 20 tablet 11     No current facility-administered medications for this visit.            Assessment and Plan    Problem List Items Addressed This Visit        Cardiac and Vasculature    Coronary artery disease  - Primary (Chronic)    Overview     · 3/5/2016 [facility] for MI: PCI ABNER to the RCA  · 6/4/2020 nuclear stress test: Myocardial perfusion imaging indicates an infarct in the inferior wall and lateral wall with  no significant ischemia  · 6/23/2020 TTE: LVEF 51 to 55%, mild TR, mild concentric hypertrophy           Relevant Medications    tadalafil (Cialis) 10 MG tablet    Essential hypertension (Chronic)    Hypertriglyceridemia (Chronic)              Follow Up     Medications were reviewed with the patient.    CAD is stable.  Patient is to continue Plavix, lisinopril, carvedilol and atorvastatin.    Continue atorvastatin, fenofibrate, and Lovaza for elevated triglycerides/dyslipidemia.  We will get labs at his next visit.    Hypertension is stable.    With regard to patient's report of erectile dysfunction, I have given him a prescription for as needed Cialis today.  I have cautioned him regarding nitrates.  He states that he knows that he is not to take any nitroglycerin for chest pain.  He also states that if he were to have chest pain and call an ambulance or come to the ER he is to tell them when he last had his dose of Cialis.    Risk factor modification: Smoking cessation counseling was given.  Patient advised regarding the correlation between cigarette smoking/oral tobacco and coronary artery disease, as well as lung disease, cancer.  Patient was offered strategies to quit, including medication.  The patient is trying to quit.    Return in about 6 months (around 1/15/2023).    Patient was given instructions and counseling regarding his condition or for health maintenance advice. Please see specific information pulled into the AVS if appropriate.

## 2023-01-03 DIAGNOSIS — E78.2 MIXED HYPERLIPIDEMIA: Chronic | ICD-10-CM

## 2023-01-03 RX ORDER — ATORVASTATIN CALCIUM 80 MG/1
80 TABLET, FILM COATED ORAL DAILY
Qty: 30 TABLET | Refills: 0 | Status: SHIPPED | OUTPATIENT
Start: 2023-01-03 | End: 2023-01-19 | Stop reason: SDUPTHER

## 2023-01-17 PROBLEM — E78.5 DYSLIPIDEMIA, GOAL LDL BELOW 70: Status: ACTIVE | Noted: 2017-05-20

## 2023-01-17 PROBLEM — E78.5 DYSLIPIDEMIA, GOAL LDL BELOW 70: Chronic | Status: ACTIVE | Noted: 2017-05-20

## 2023-01-17 NOTE — PROGRESS NOTES
"Chief Complaint  No chief complaint on file.    Subjective          Claude E Campbell presents to [facility] CARDIOLOGY for follow up.    History of Present Illness    Mr. Clement was last seen in clinic on 7/15/2022.  He was stable at that time.  He was given a prescription for tadalafil at that visit.    At today's visit Mr. Clement states that he has been doing well.  He denies any chest pain or shortness of breath.  He denies dyspnea on exertion.  He denies any palpitations.  He reports medication compliance.    Objective     Vital Signs:   /81 (BP Location: Left arm, Patient Position: Sitting, Cuff Size: Adult)   Pulse 70   Ht 182.9 cm (72\")   Wt 88.6 kg (195 lb 6.4 oz)   SpO2 97%   BMI 26.50 kg/m²       Physical Exam  Constitutional:       Appearance: Normal appearance. He is well-developed.   Cardiovascular:      Rate and Rhythm: Normal rate and regular rhythm.      Heart sounds: No murmur heard.    No friction rub. No gallop.   Pulmonary:      Effort: Pulmonary effort is normal. No respiratory distress.      Breath sounds: Normal breath sounds. No wheezing or rales.   Skin:     General: Skin is warm and dry.   Neurological:      Mental Status: He is alert and oriented to person, place, and time.   Psychiatric:         Mood and Affect: Mood normal.         Behavior: Behavior normal.          Result Review :                Current Outpatient Medications   Medication Sig Dispense Refill   • amLODIPine (NORVASC) 5 MG tablet Take 1 tablet by mouth Daily. for blood pressure 90 tablet 3   • atorvastatin (LIPITOR) 80 MG tablet Take 1 tablet by mouth Daily. 90 tablet 3   • carvedilol (COREG) 3.125 MG tablet Take 1 tablet by mouth 2 (Two) Times a Day With Meals. 180 tablet 3   • clopidogrel (PLAVIX) 75 MG tablet Take 1 tablet by mouth Daily. 90 tablet 3   • docusate sodium (COLACE) 100 MG capsule Take 1 capsule by mouth Daily As Needed for Constipation. 90 capsule 2   • fenofibrate " (TRICOR) 48 MG tablet Take 1 tablet by mouth Daily. 90 tablet 3   • lisinopril (PRINIVIL,ZESTRIL) 20 MG tablet Take 1 tablet by mouth Daily. 90 tablet 3   • omega-3 acid ethyl esters (LOVAZA) 1 g capsule Take 2 capsules by mouth 2 (Two) Times a Day. 360 capsule 3   • tadalafil (Cialis) 10 MG tablet Take 1 tablet by mouth Daily As Needed for Erectile Dysfunction. Do not take more than 2 tabs in 24 hour period 20 tablet 11   • vitamin D (ERGOCALCIFEROL) 1.25 MG (63571 UT) capsule capsule Take 1 capsule by mouth 1 (One) Time Per Week. 5 capsule 2   • nicotine (Nicoderm CQ) 21 MG/24HR patch Place 1 patch on the skin as directed by provider Daily. 28 patch 3     No current facility-administered medications for this visit.            Assessment and Plan    Problem List Items Addressed This Visit        Cardiac and Vasculature    Coronary artery disease  - Primary (Chronic)    Overview     · 3/5/2016 [facility] for MI: PCI ABNER to the RCA  · 6/4/2020 nuclear stress test: Myocardial perfusion imaging indicates an infarct in the inferior wall and lateral wall with no significant ischemia  · 6/23/2020 TTE: LVEF 51 to 55%, mild TR, mild concentric hypertrophy         Relevant Medications    clopidogrel (PLAVIX) 75 MG tablet    carvedilol (COREG) 3.125 MG tablet    amLODIPine (NORVASC) 5 MG tablet    Dyslipidemia, goal LDL below 70 (Chronic)    Overview     · 6/24/2020 total cholesterol 145, triglycerides 150, HDL 46, and LDL 69  · 6/30/2021 total cholesterol 156, triglycerides 486, HDL 34, and LDL 50  · 1/11/2022 total cholesterol 163, triglycerides 288, HDL 36, and LDL 80         Relevant Medications    atorvastatin (LIPITOR) 80 MG tablet    fenofibrate (TRICOR) 48 MG tablet    omega-3 acid ethyl esters (LOVAZA) 1 g capsule    Other Relevant Orders    Ambulatory Referral to Specialty Pharmacy    Essential hypertension (Chronic)    Relevant Medications    carvedilol (COREG) 3.125 MG tablet    amLODIPine (NORVASC) 5 MG tablet     lisinopril (PRINIVIL,ZESTRIL) 20 MG tablet    Hypertriglyceridemia (Chronic)    Relevant Medications    atorvastatin (LIPITOR) 80 MG tablet    fenofibrate (TRICOR) 48 MG tablet    omega-3 acid ethyl esters (LOVAZA) 1 g capsule    Other Relevant Orders    Ambulatory Referral to Specialty Pharmacy   Other Visit Diagnoses     Mixed hyperlipidemia  (Chronic)       Relevant Medications    atorvastatin (LIPITOR) 80 MG tablet    fenofibrate (TRICOR) 48 MG tablet    omega-3 acid ethyl esters (LOVAZA) 1 g capsule              Follow Up     Medications were reviewed with the patient.    CAD is stable.  Continue Plavix, carvedilol, atorvastatin, and lisinopril.    Hypertension is controlled continue amlodipine.    With regard to dyslipidemia and patient's history of hypertriglyceridemia.  He is currently on 3 medications for this: Atorvastatin, fenofibrate, and Lovaza.  I have asked him about the possibility of starting for Leqvio.  He is interested.  I will make referral to lipid clinic.    Risk factor modification: Smoking cessation counseling was given.  Patient advised regarding the correlation between cigarette smoking and oral tobacco and coronary artery disease, as well as lung disease, cancer.  Patient was offered strategies to quit, including medication.  The patient is interested in quitting.  I have given him a prescription today for NicoDerm.      Return in about 6 months (around 7/19/2023).    Patient was given instructions and counseling regarding his condition or for health maintenance advice. Please see specific information pulled into the AVS if appropriate.

## 2023-01-19 ENCOUNTER — OFFICE VISIT (OUTPATIENT)
Dept: CARDIOLOGY | Facility: CLINIC | Age: 58
End: 2023-01-19
Payer: MEDICAID

## 2023-01-19 VITALS
DIASTOLIC BLOOD PRESSURE: 81 MMHG | HEIGHT: 72 IN | BODY MASS INDEX: 26.47 KG/M2 | SYSTOLIC BLOOD PRESSURE: 123 MMHG | HEART RATE: 70 BPM | WEIGHT: 195.4 LBS | OXYGEN SATURATION: 97 %

## 2023-01-19 DIAGNOSIS — E78.5 DYSLIPIDEMIA, GOAL LDL BELOW 70: Chronic | ICD-10-CM

## 2023-01-19 DIAGNOSIS — I10 ESSENTIAL HYPERTENSION: Chronic | ICD-10-CM

## 2023-01-19 DIAGNOSIS — E78.1 HYPERTRIGLYCERIDEMIA: Chronic | ICD-10-CM

## 2023-01-19 DIAGNOSIS — E78.2 MIXED HYPERLIPIDEMIA: Chronic | ICD-10-CM

## 2023-01-19 DIAGNOSIS — I25.10 CORONARY ARTERY DISEASE INVOLVING NATIVE CORONARY ARTERY OF NATIVE HEART WITHOUT ANGINA PECTORIS: Primary | Chronic | ICD-10-CM

## 2023-01-19 PROCEDURE — 99214 OFFICE O/P EST MOD 30 MIN: CPT | Performed by: NURSE PRACTITIONER

## 2023-01-19 RX ORDER — CARVEDILOL 3.12 MG/1
3.12 TABLET ORAL 2 TIMES DAILY WITH MEALS
Qty: 180 TABLET | Refills: 3 | Status: SHIPPED | OUTPATIENT
Start: 2023-01-19

## 2023-01-19 RX ORDER — NICOTINE 21 MG/24HR
1 PATCH, TRANSDERMAL 24 HOURS TRANSDERMAL EVERY 24 HOURS
Qty: 28 PATCH | Refills: 3 | Status: SHIPPED | OUTPATIENT
Start: 2023-01-19

## 2023-01-19 RX ORDER — FENOFIBRATE 48 MG/1
48 TABLET, COATED ORAL DAILY
Qty: 90 TABLET | Refills: 3 | Status: SHIPPED | OUTPATIENT
Start: 2023-01-19 | End: 2023-02-17 | Stop reason: ALTCHOICE

## 2023-01-19 RX ORDER — LISINOPRIL 20 MG/1
20 TABLET ORAL DAILY
Qty: 90 TABLET | Refills: 3 | Status: SHIPPED | OUTPATIENT
Start: 2023-01-19

## 2023-01-19 RX ORDER — CLOPIDOGREL BISULFATE 75 MG/1
75 TABLET ORAL DAILY
Qty: 90 TABLET | Refills: 3 | Status: SHIPPED | OUTPATIENT
Start: 2023-01-19

## 2023-01-19 RX ORDER — OMEGA-3-ACID ETHYL ESTERS 1 G/1
2 CAPSULE, LIQUID FILLED ORAL 2 TIMES DAILY
Qty: 360 CAPSULE | Refills: 3 | Status: SHIPPED | OUTPATIENT
Start: 2023-01-19 | End: 2023-02-17 | Stop reason: ALTCHOICE

## 2023-01-19 RX ORDER — AMLODIPINE BESYLATE 5 MG/1
5 TABLET ORAL DAILY
Qty: 90 TABLET | Refills: 3 | Status: SHIPPED | OUTPATIENT
Start: 2023-01-19

## 2023-01-19 RX ORDER — ATORVASTATIN CALCIUM 80 MG/1
80 TABLET, FILM COATED ORAL DAILY
Qty: 90 TABLET | Refills: 3 | Status: SHIPPED | OUTPATIENT
Start: 2023-01-19

## 2023-02-03 ENCOUNTER — DISEASE STATE MANAGEMENT VISIT (OUTPATIENT)
Dept: PHARMACY | Facility: HOSPITAL | Age: 58
End: 2023-02-03
Payer: MEDICAID

## 2023-02-03 ENCOUNTER — LAB (OUTPATIENT)
Dept: LAB | Facility: HOSPITAL | Age: 58
End: 2023-02-03
Payer: MEDICAID

## 2023-02-03 ENCOUNTER — SPECIALTY PHARMACY (OUTPATIENT)
Dept: PHARMACY | Facility: HOSPITAL | Age: 58
End: 2023-02-03
Payer: MEDICAID

## 2023-02-03 DIAGNOSIS — I25.10 CORONARY ARTERY DISEASE INVOLVING NATIVE CORONARY ARTERY OF NATIVE HEART WITHOUT ANGINA PECTORIS: Primary | Chronic | ICD-10-CM

## 2023-02-03 DIAGNOSIS — I25.10 CORONARY ARTERY DISEASE INVOLVING NATIVE CORONARY ARTERY OF NATIVE HEART WITHOUT ANGINA PECTORIS: Chronic | ICD-10-CM

## 2023-02-03 LAB
CHOLEST SERPL-MCNC: 134 MG/DL (ref 0–200)
HDLC SERPL-MCNC: 45 MG/DL (ref 40–60)
LDLC SERPL CALC-MCNC: 62 MG/DL (ref 0–100)
LDLC/HDLC SERPL: 1.28 {RATIO}
TRIGL SERPL-MCNC: 156 MG/DL (ref 0–150)
VLDLC SERPL-MCNC: 27 MG/DL (ref 5–40)

## 2023-02-03 PROCEDURE — 36415 COLL VENOUS BLD VENIPUNCTURE: CPT

## 2023-02-03 PROCEDURE — 80061 LIPID PANEL: CPT | Performed by: NURSE PRACTITIONER

## 2023-02-03 RX ORDER — BUPRENORPHINE HYDROCHLORIDE AND NALOXONE HYDROCHLORIDE DIHYDRATE 8; 2 MG/1; MG/1
TABLET SUBLINGUAL
COMMUNITY
Start: 2023-01-25

## 2023-02-03 RX ORDER — INCLISIRAN 284 MG/1.5ML
284 INJECTION, SOLUTION SUBCUTANEOUS
Qty: 1.5 ML | Refills: 1 | Status: SHIPPED | OUTPATIENT
Start: 2023-02-03

## 2023-02-03 RX ORDER — GABAPENTIN 400 MG/1
CAPSULE ORAL
COMMUNITY
Start: 2023-01-10

## 2023-02-03 NOTE — PROGRESS NOTES
Medication Management Clinic  Lipid Management Program - Leqvio (Inclisiran)     Claude E Campbell is a 57 y.o. male referred to the Medication Management Clinic by Opal Ho for clinical pharmacy and specialty pharmacy management of Inclisiran.  Claude E Campbell is  treated for clinical ASCVD and has a stent. Patient currently takes Atorvastatin 80 mg, Fenofibrate, and Lovaza.  In the past, Pt has tried not tried any other statin therapy. Patient denies any latex allergy.    Patient has had lipid panel from 01/2022. Patient reports that he recently had labs completed at his PCP's office last week. We contacted PCP office and labs have not resulted yet. Reached out to Opal Ho about whether or not patient should receive Inclisiran injection today. Provider would like patient to have baseline labs, we ordered STAT lipid panel, and patient to have labs done today.     Lipid Panel    Lipid Panel 2/3/23   Total Cholesterol 134   Triglycerides 156 (A)   HDL Cholesterol 45   VLDL Cholesterol 27   LDL Cholesterol  62   LDL/HDL Ratio 1.28   (A) Abnormal value            Based on today's results, provider would like patient to start on Leqvio and discontinue Fenofibrate and Lovaza at that time. Patient has rescheduled appointment for 2/15/23 and will need to discuss these changes with the patient.     Will pause specialty enrollment until 2/14/23. Since we had not administered the medication in clinic today, will need to create an I-vent at the next visit.     Thank you,    Stacie Haskins, [facility]  02/03/23  16:22 EST

## 2023-02-03 NOTE — PROGRESS NOTES
Medication Management Clinic  Lipid Management Program - Leqvio (Inclisiran)     Claude E Campbell is a 57 y.o. male referred to the Medication Management Clinic by Opal Ho for clinical pharmacy and specialty pharmacy management of Inclisiran.  Claude E Campbell is  treated for clinical ASCVD and has a stent. Patient currently takes Atorvastatin 80 mg, Fenofibrate, and Lovaza.  In the past, Pt has tried not tried any other statin therapy. Patient denies any latex allergy.    Patient has had lipid panel from 01/2022. Patient reports that he recently had labs completed at his PCP's office last week. We contacted PCP office and labs have not resulted yet. Reached out to Opal Ho about whether or not patient should receive Inclisiran injection today. Provider would like patient to have baseline labs, we ordered STAT lipid panel, and patient to have labs done today.     Lipid Panel    Lipid Panel 2/3/23   Total Cholesterol 134   Triglycerides 156 (A)   HDL Cholesterol 45   VLDL Cholesterol 27   LDL Cholesterol  62   LDL/HDL Ratio 1.28   (A) Abnormal value            Based on today's results, provider would like patient to start on Leqvio and discontinue Fenofibrate and Lovaza at that time. Patient has rescheduled appointment for 2/15/23 and will need to discuss these changes with the patient.     Will pause specialty enrollment until 2/14/23. Since we had not administered the medication in clinic today, will need to create an I-vent at the next visit.     Thank you,    Stacie Haskins, [facility]  02/03/23  16:38 EST

## 2023-02-07 ENCOUNTER — TELEPHONE (OUTPATIENT)
Dept: CARDIOLOGY | Facility: CLINIC | Age: 58
End: 2023-02-07
Payer: MEDICAID

## 2023-02-07 NOTE — TELEPHONE ENCOUNTER
spk with patient gave lab results, per Opal. Patient is agreeable to take the medication Leqvio and states he will f/u soon. He was at work and had to hurry off phone call.        ----- Message from VISHAL Keys sent at 2/6/2023  5:15 PM EST -----  Please call patient about their lipid panel.  There has been improvement to the triglycerides.  A year ago your triglycerides were 288 and the lab on 2/3/2023 shows triglycerides at 156.  Triglyceride goal is for it to be less than 150.  Your LDL cholesterol was 80 previously and it is now 62.  LDL goal is for it to be less than 55.  You would definitely benefit from Leqvio.    Thanks, Opal

## 2023-02-17 ENCOUNTER — DISEASE STATE MANAGEMENT VISIT (OUTPATIENT)
Dept: PHARMACY | Facility: HOSPITAL | Age: 58
End: 2023-02-17
Payer: MEDICAID

## 2023-02-17 DIAGNOSIS — E78.5 DYSLIPIDEMIA, GOAL LDL BELOW 70: Primary | Chronic | ICD-10-CM

## 2023-02-17 NOTE — PROGRESS NOTES
[facility]  Lipid Management Program - Leqvio (Inclisiran)     Claude E Campbell is a 57 y.o. male referred to the [facility] by Opal Ho for clinical pharmacy and specialty pharmacy management of [facility].  Claude E Campbell is  treated for clinical ASCVD and currently takes atorvastatin 80mg.       Relevant Past Medical History and Comorbidities  Past Medical History:   Diagnosis Date   • Coronary artery disease    • Elevated cholesterol    • Hypertension    • Myocardial infarction (HCC)      Social History     Socioeconomic History   • Marital status:    Tobacco Use   • Smoking status: Every Day     Packs/day: 1.00     Years: 20.00     Pack years: 20.00     Types: Cigarettes   • Smokeless tobacco: Current     Types: Snuff   Vaping Use   • Vaping Use: Never used   Substance and Sexual Activity   • Alcohol use: Yes     Alcohol/week: 6.0 standard drinks     Types: 6 Cans of beer per week     Comment: COUPLE PER DAY    • Drug use: No   • Sexual activity: Defer       Allergies  Patient has no known allergies.    Current Medication List    Current Outpatient Medications:   •  amLODIPine (NORVASC) 5 MG tablet, Take 1 tablet by mouth Daily. for blood pressure, Disp: 90 tablet, Rfl: 3  •  atorvastatin (LIPITOR) 80 MG tablet, Take 1 tablet by mouth Daily., Disp: 90 tablet, Rfl: 3  •  buprenorphine-naloxone (SUBOXONE) 8-2 MG per SL tablet, PLACE 2 TABLETS UNDER THE TONGUE AND LET DISSOLVE EVERY DAY AS DIRECTED, Disp: , Rfl:   •  carvedilol (COREG) 3.125 MG tablet, Take 1 tablet by mouth 2 (Two) Times a Day With Meals., Disp: 180 tablet, Rfl: 3  •  clopidogrel (PLAVIX) 75 MG tablet, Take 1 tablet by mouth Daily., Disp: 90 tablet, Rfl: 3  •  docusate sodium (COLACE) 100 MG capsule, Take 1 capsule by mouth Daily As Needed for Constipation., Disp: 90 capsule, Rfl: 2  •  gabapentin (NEURONTIN) 400 MG capsule, TAKE ONE CAPSULE BY MOUTH THREE TIMES A DAY AS NEEDED FOR NERVE  PAIN, Disp: , Rfl:   •  lisinopril (PRINIVIL,ZESTRIL) 20 MG tablet, Take 1 tablet by mouth Daily., Disp: 90 tablet, Rfl: 3  •  tadalafil (Cialis) 10 MG tablet, Take 1 tablet by mouth Daily As Needed for Erectile Dysfunction. Do not take more than 2 tabs in 24 hour period, Disp: 20 tablet, Rfl: 11  •  vitamin D (ERGOCALCIFEROL) 1.25 MG (01826 UT) capsule capsule, Take 1 capsule by mouth 1 (One) Time Per Week., Disp: 5 capsule, Rfl: 2  •  Inclisiran Sodium (Leqvio) 284 MG/1.5ML solution prefilled syringe, Inject 1.5 mL under the skin into the appropriate area as directed Every 3 (Three) Months., Disp: 1.5 mL, Rfl: 1  •  nicotine (Nicoderm CQ) 21 MG/24HR patch, Place 1 patch on the skin as directed by provider Daily., Disp: 28 patch, Rfl: 3    Drug Interactions  None with Inclisiran    Relevant Laboratory Values  Lab Results   Component Value Date    CHOL 134 02/03/2023    CHLPL 163 03/05/2016    TRIG 156 (H) 02/03/2023    HDL 45 02/03/2023    LDL 62 02/03/2023       Medication Assessment & Plan    1. Administered Leqvio 284mg SUBQ in back of let arm.   2. Medication education provided, including:   a. Common Adverse Effects: Injection site reactions, arthralgias, UTIs, diarrhea, bronchitis, pain in extremity, and dyspnea.  b. Potential for allergic reaction.  Go to ED/call 911 with any S/Sx of allergic reaction.   c. Must be administered by a HCP.  If a dose is missed, please call to reschedule.   d. Expect LDL reduction, to help reduce cardiovascular risk.   e. At each visit, patient will need to stay a minimum of 15 min for monitoring   3. Lipid panel will be checked 4 to 12 weeks after starting therapy, order placed.   4. Patient will continue regular follow-up with cardiology  5. Will follow up in 3 months for next injection.     Lucille Huff, PharmD  2/17/2023  08:53 EST

## 2023-05-18 ENCOUNTER — DISEASE STATE MANAGEMENT VISIT (OUTPATIENT)
Dept: PHARMACY | Facility: HOSPITAL | Age: 58
End: 2023-05-18
Payer: MEDICAID

## 2023-05-18 ENCOUNTER — SPECIALTY PHARMACY (OUTPATIENT)
Dept: PHARMACY | Facility: HOSPITAL | Age: 58
End: 2023-05-18
Payer: MEDICAID

## 2023-05-18 RX ORDER — PANTOPRAZOLE SODIUM 40 MG/1
40 TABLET, DELAYED RELEASE ORAL DAILY
COMMUNITY
Start: 2023-05-02

## 2023-05-18 RX ORDER — ONDANSETRON 4 MG/1
4 TABLET, ORALLY DISINTEGRATING ORAL 2 TIMES DAILY PRN
COMMUNITY
Start: 2023-05-02

## 2023-05-18 RX ORDER — FENOFIBRATE 48 MG/1
48 TABLET, COATED ORAL DAILY
COMMUNITY
Start: 2023-05-02

## 2023-05-18 NOTE — PROGRESS NOTES
[facility]  Lipid Management Program - Leqvio (Inclisiran)     Claude E Campbell is a 58 y.o. male referred to the [facility] Clinic by Opal Ho for clinical pharmacy and specialty pharmacy management of [facility].  Claude E Campbell is  treated for clinical ASCVD and currently takes atorvastatin 80mg.       Patient reports tolerating previous injection well with no issues/concerns. He reports that he has not had his labs done yet. He plans to get them done next week.    Relevant Past Medical History and Comorbidities  Past Medical History:   Diagnosis Date   • Coronary artery disease    • Elevated cholesterol    • Hypertension    • Myocardial infarction      Social History     Socioeconomic History   • Marital status:    Tobacco Use   • Smoking status: Every Day     Packs/day: 1.00     Years: 20.00     Pack years: 20.00     Types: Cigarettes   • Smokeless tobacco: Current     Types: Snuff   Vaping Use   • Vaping Use: Never used   Substance and Sexual Activity   • Alcohol use: Yes     Alcohol/week: 6.0 standard drinks     Types: 6 Cans of beer per week     Comment: COUPLE PER DAY    • Drug use: No   • Sexual activity: Defer       Allergies  Patient has no known allergies.    Current Medication List    Current Outpatient Medications:   •  amLODIPine (NORVASC) 5 MG tablet, Take 1 tablet by mouth Daily. for blood pressure, Disp: 90 tablet, Rfl: 3  •  atorvastatin (LIPITOR) 80 MG tablet, Take 1 tablet by mouth Daily., Disp: 90 tablet, Rfl: 3  •  buprenorphine-naloxone (SUBOXONE) 8-2 MG per SL tablet, PLACE 2 TABLETS UNDER THE TONGUE AND LET DISSOLVE EVERY DAY AS DIRECTED, Disp: , Rfl:   •  carvedilol (COREG) 3.125 MG tablet, Take 1 tablet by mouth 2 (Two) Times a Day With Meals., Disp: 180 tablet, Rfl: 3  •  clopidogrel (PLAVIX) 75 MG tablet, Take 1 tablet by mouth Daily., Disp: 90 tablet, Rfl: 3  •  docusate sodium (COLACE) 100 MG capsule, Take 1 capsule by mouth Daily As  Needed for Constipation., Disp: 90 capsule, Rfl: 2  •  gabapentin (NEURONTIN) 400 MG capsule, TAKE ONE CAPSULE BY MOUTH THREE TIMES A DAY AS NEEDED FOR NERVE PAIN, Disp: , Rfl:   •  Inclisiran Sodium (Leqvio) 284 MG/1.5ML solution prefilled syringe, Inject 1.5 mL under the skin into the appropriate area as directed Every 3 (Three) Months., Disp: 1.5 mL, Rfl: 1  •  lisinopril (PRINIVIL,ZESTRIL) 20 MG tablet, Take 1 tablet by mouth Daily., Disp: 90 tablet, Rfl: 3  •  nicotine (Nicoderm CQ) 21 MG/24HR patch, Place 1 patch on the skin as directed by provider Daily., Disp: 28 patch, Rfl: 3  •  tadalafil (Cialis) 10 MG tablet, Take 1 tablet by mouth Daily As Needed for Erectile Dysfunction. Do not take more than 2 tabs in 24 hour period, Disp: 20 tablet, Rfl: 11  •  vitamin D (ERGOCALCIFEROL) 1.25 MG (92966 UT) capsule capsule, Take 1 capsule by mouth 1 (One) Time Per Week., Disp: 5 capsule, Rfl: 2    Drug Interactions  None with Inclisiran    Relevant Laboratory Values  Lab Results   Component Value Date    CHOL 134 02/03/2023    CHLPL 163 03/05/2016    TRIG 156 (H) 02/03/2023    HDL 45 02/03/2023    LDL 62 02/03/2023       Medication Assessment & Plan    1. Administered Leqvio 284mg SUBQ in back of RIGHT arm.   2. Medication education provided, including:   a. Common Adverse Effects: Injection site reactions, arthralgias, UTIs, diarrhea, bronchitis, pain in extremity, and dyspnea.  b. Potential for allergic reaction.  Go to ED/call 911 with any S/Sx of allergic reaction.   c. Must be administered by a HCP.  If a dose is missed, please call to reschedule.   d. Expect LDL reduction, to help reduce cardiovascular risk.   e. At each visit, patient will need to stay a minimum of 15 min for monitoring   3. Patient will get lipid panel next week.  4. Patient will continue regular follow-up with cardiology  5. Will follow up in 6 months for next injection.     Janis Marinelli, PharmD  5/18/2023  08:52  EDT

## 2023-05-18 NOTE — PROGRESS NOTES
[facility]  Lipid Management Program - Leqvio (Inclisiran)     Claude E Campbell is a 58 y.o. male referred to the [facility] Clinic by Opal Ho for clinical pharmacy and specialty pharmacy management of [facility].  Claude E Campbell is  treated for clinical ASCVD and currently takes atorvastatin 80mg.       Patient reports tolerating previous injection well with no issues/concerns. He reports that he has not had his labs done yet. He plans to get them done next week.    Relevant Past Medical History and Comorbidities  Past Medical History:   Diagnosis Date   • Coronary artery disease    • Elevated cholesterol    • Hypertension    • Myocardial infarction      Social History     Socioeconomic History   • Marital status:    Tobacco Use   • Smoking status: Every Day     Packs/day: 1.00     Years: 20.00     Pack years: 20.00     Types: Cigarettes   • Smokeless tobacco: Current     Types: Snuff   Vaping Use   • Vaping Use: Never used   Substance and Sexual Activity   • Alcohol use: Yes     Alcohol/week: 6.0 standard drinks     Types: 6 Cans of beer per week     Comment: COUPLE PER DAY    • Drug use: No   • Sexual activity: Defer       Allergies  Patient has no known allergies.    Current Medication List    Current Outpatient Medications:   •  amLODIPine (NORVASC) 5 MG tablet, Take 1 tablet by mouth Daily. for blood pressure, Disp: 90 tablet, Rfl: 3  •  atorvastatin (LIPITOR) 80 MG tablet, Take 1 tablet by mouth Daily., Disp: 90 tablet, Rfl: 3  •  buprenorphine-naloxone (SUBOXONE) 8-2 MG per SL tablet, PLACE 2 TABLETS UNDER THE TONGUE AND LET DISSOLVE EVERY DAY AS DIRECTED, Disp: , Rfl:   •  carvedilol (COREG) 3.125 MG tablet, Take 1 tablet by mouth 2 (Two) Times a Day With Meals., Disp: 180 tablet, Rfl: 3  •  clopidogrel (PLAVIX) 75 MG tablet, Take 1 tablet by mouth Daily., Disp: 90 tablet, Rfl: 3  •  docusate sodium (COLACE) 100 MG capsule, Take 1 capsule by mouth Daily As  Needed for Constipation., Disp: 90 capsule, Rfl: 2  •  fenofibrate (TRICOR) 48 MG tablet, Take 1 tablet by mouth Daily. for cholesterol, Disp: , Rfl:   •  gabapentin (NEURONTIN) 400 MG capsule, TAKE ONE CAPSULE BY MOUTH THREE TIMES A DAY AS NEEDED FOR NERVE PAIN, Disp: , Rfl:   •  Inclisiran Sodium (Leqvio) 284 MG/1.5ML solution prefilled syringe, Inject 1.5 mL under the skin into the appropriate area as directed Every 3 (Three) Months., Disp: 1.5 mL, Rfl: 1  •  lisinopril (PRINIVIL,ZESTRIL) 20 MG tablet, Take 1 tablet by mouth Daily., Disp: 90 tablet, Rfl: 3  •  nicotine (Nicoderm CQ) 21 MG/24HR patch, Place 1 patch on the skin as directed by provider Daily., Disp: 28 patch, Rfl: 3  •  ondansetron ODT (ZOFRAN-ODT) 4 MG disintegrating tablet, Place 1 tablet under the tongue 2 (Two) Times a Day As Needed for Nausea., Disp: , Rfl:   •  pantoprazole (PROTONIX) 40 MG EC tablet, Take 1 tablet by mouth Daily., Disp: , Rfl:   •  tadalafil (Cialis) 10 MG tablet, Take 1 tablet by mouth Daily As Needed for Erectile Dysfunction. Do not take more than 2 tabs in 24 hour period, Disp: 20 tablet, Rfl: 11  •  vitamin D (ERGOCALCIFEROL) 1.25 MG (75652 UT) capsule capsule, Take 1 capsule by mouth 1 (One) Time Per Week., Disp: 5 capsule, Rfl: 2    Drug Interactions  None with Inclisiran    Relevant Laboratory Values  Lab Results   Component Value Date    CHOL 134 02/03/2023    CHLPL 163 03/05/2016    TRIG 156 (H) 02/03/2023    HDL 45 02/03/2023    LDL 62 02/03/2023       Medication Assessment & Plan    1. Administered Leqvio 284mg SUBQ in back of RIGHT arm.   2. Medication education provided, including:   a. Common Adverse Effects: Injection site reactions, arthralgias, UTIs, diarrhea, bronchitis, pain in extremity, and dyspnea.  b. Potential for allergic reaction.  Go to ED/call 911 with any S/Sx of allergic reaction.   c. Must be administered by a HCP.  If a dose is missed, please call to reschedule.   d. Expect LDL reduction, to  help reduce cardiovascular risk.   e. At each visit, patient will need to stay a minimum of 15 min for monitoring   3. Patient will get lipid panel next week.  4. Patient will continue regular follow-up with cardiology  5. Will follow up in 6 months for next injection.     Janis Marinelli, PharmD  5/18/2023  09:14 EDT

## 2023-11-02 ENCOUNTER — SPECIALTY PHARMACY (OUTPATIENT)
Dept: PHARMACY | Facility: HOSPITAL | Age: 58
End: 2023-11-02
Payer: MEDICAID

## 2023-11-02 ENCOUNTER — DISEASE STATE MANAGEMENT VISIT (OUTPATIENT)
Dept: PHARMACY | Facility: HOSPITAL | Age: 58
End: 2023-11-02
Payer: MEDICAID

## 2023-11-02 RX ORDER — INCLISIRAN 284 MG/1.5ML
284 INJECTION, SOLUTION SUBCUTANEOUS
Qty: 1.5 ML | Refills: 0 | Status: SHIPPED | OUTPATIENT
Start: 2023-11-02

## 2023-11-02 RX ORDER — INCLISIRAN 284 MG/1.5ML
284 INJECTION, SOLUTION SUBCUTANEOUS
Qty: 1.5 ML | Refills: 0 | Status: SHIPPED | OUTPATIENT
Start: 2023-11-02 | End: 2023-11-02 | Stop reason: SDUPTHER

## 2023-11-02 NOTE — PROGRESS NOTES
[facility]  Lipid Management Program - Leqvio (Inclisiran)     Claude E Campbell is a 58 y.o. male referred to the [facility] Management Clinic by Opal Ho for clinical pharmacy and specialty pharmacy management of [facility].  Claude E Campbell is  treated for clinical ASCVD and currently takes atorvastatin 80mg.       Patient reports tolerating previous injection well with no issues/concerns. He reports that he has not had his labs done yet. He plans to get them done next week.    Relevant Past Medical History and Comorbidities  Past Medical History:   Diagnosis Date    Coronary artery disease     Elevated cholesterol     Hypertension     Myocardial infarction      Social History     Socioeconomic History    Marital status:    Tobacco Use    Smoking status: Every Day     Packs/day: 1.00     Years: 20.00     Additional pack years: 0.00     Total pack years: 20.00     Types: Cigarettes    Smokeless tobacco: Current     Types: Snuff   Vaping Use    Vaping Use: Never used   Substance and Sexual Activity    Alcohol use: Yes     Alcohol/week: 6.0 standard drinks of alcohol     Types: 6 Cans of beer per week     Comment: COUPLE PER DAY     Drug use: No    Sexual activity: Defer       Allergies  Patient has no known allergies.    Current Medication List    Current Outpatient Medications:     amLODIPine (NORVASC) 5 MG tablet, Take 1 tablet by mouth Daily. for blood pressure, Disp: 90 tablet, Rfl: 3    atorvastatin (LIPITOR) 80 MG tablet, Take 1 tablet by mouth Daily., Disp: 90 tablet, Rfl: 3    buprenorphine-naloxone (SUBOXONE) 8-2 MG per SL tablet, PLACE 2 TABLETS UNDER THE TONGUE AND LET DISSOLVE EVERY DAY AS DIRECTED, Disp: , Rfl:     carvedilol (COREG) 3.125 MG tablet, Take 1 tablet by mouth 2 (Two) Times a Day With Meals., Disp: 180 tablet, Rfl: 3    clopidogrel (PLAVIX) 75 MG tablet, Take 1 tablet by mouth Daily., Disp: 90 tablet, Rfl: 3    docusate sodium (COLACE) 100 MG capsule,  Take 1 capsule by mouth Daily As Needed for Constipation., Disp: 90 capsule, Rfl: 2    fenofibrate (TRICOR) 48 MG tablet, Take 1 tablet by mouth Daily. for cholesterol, Disp: , Rfl:     gabapentin (NEURONTIN) 400 MG capsule, TAKE ONE CAPSULE BY MOUTH THREE TIMES A DAY AS NEEDED FOR NERVE PAIN, Disp: , Rfl:     lisinopril (PRINIVIL,ZESTRIL) 20 MG tablet, Take 1 tablet by mouth Daily., Disp: 90 tablet, Rfl: 3    nicotine (Nicoderm CQ) 21 MG/24HR patch, Place 1 patch on the skin as directed by provider Daily., Disp: 28 patch, Rfl: 3    ondansetron ODT (ZOFRAN-ODT) 4 MG disintegrating tablet, Place 1 tablet under the tongue 2 (Two) Times a Day As Needed for Nausea., Disp: , Rfl:     pantoprazole (PROTONIX) 40 MG EC tablet, Take 1 tablet by mouth Daily., Disp: , Rfl:     tadalafil (Cialis) 10 MG tablet, Take 1 tablet by mouth Daily As Needed for Erectile Dysfunction. Do not take more than 2 tabs in 24 hour period, Disp: 20 tablet, Rfl: 11    vitamin D (ERGOCALCIFEROL) 1.25 MG (42393 UT) capsule capsule, Take 1 capsule by mouth 1 (One) Time Per Week., Disp: 5 capsule, Rfl: 2    Drug Interactions  None with Inclisiran    Relevant Laboratory Values  Lab Results   Component Value Date    CHOL 105 07/05/2023    CHLPL 163 03/05/2016    TRIG 176 (H) 07/05/2023    HDL 43 07/05/2023    LDL 33 07/05/2023       Medication Assessment & Plan    Administered Leqvio 284mg SUBQ in back of LEFT arm.   Medication education provided, including:   Common Adverse Effects: Injection site reactions, arthralgias, UTIs, diarrhea, bronchitis, pain in extremity, and dyspnea.  Potential for allergic reaction.  Go to ED/call 911 with any S/Sx of allergic reaction.   Must be administered by a HCP.  If a dose is missed, please call to reschedule.   Expect LDL reduction, to help reduce cardiovascular risk.   At each visit, patient will need to stay a minimum of 15 min for monitoring   Patient will get lipid panel next week.  Patient will continue  regular follow-up with cardiology  Will follow up in 6 months for next injection.     Megan Benjamin RPH  11/2/2023  09:10 EDT

## 2023-11-02 NOTE — PROGRESS NOTES
Medication Management Clinic  Lipid Management Program - Leqvio (Inclisiran)     Claude E Campbell is a 58 y.o. male referred to the [facility] Management Clinic by Opal Ho for clinical pharmacy and specialty pharmacy management of [facility].  Claude E Campbell is  treated for clinical ASCVD and currently takes atorvastatin 80mg.       Patient reports tolerating previous injection well with no issues/concerns. He reports that he has not had his labs done yet. He plans to get them done next week.    Relevant Past Medical History and Comorbidities  Past Medical History:   Diagnosis Date    Coronary artery disease     Elevated cholesterol     Hypertension     Myocardial infarction      Social History     Socioeconomic History    Marital status:    Tobacco Use    Smoking status: Every Day     Packs/day: 1.00     Years: 20.00     Additional pack years: 0.00     Total pack years: 20.00     Types: Cigarettes    Smokeless tobacco: Current     Types: Snuff   Vaping Use    Vaping Use: Never used   Substance and Sexual Activity    Alcohol use: Yes     Alcohol/week: 6.0 standard drinks of alcohol     Types: 6 Cans of beer per week     Comment: COUPLE PER DAY     Drug use: No    Sexual activity: Defer       Allergies  Patient has no known allergies.    Current Medication List    Current Outpatient Medications:     amLODIPine (NORVASC) 5 MG tablet, Take 1 tablet by mouth Daily. for blood pressure, Disp: 90 tablet, Rfl: 3    atorvastatin (LIPITOR) 80 MG tablet, Take 1 tablet by mouth Daily., Disp: 90 tablet, Rfl: 3    buprenorphine-naloxone (SUBOXONE) 8-2 MG per SL tablet, PLACE 2 TABLETS UNDER THE TONGUE AND LET DISSOLVE EVERY DAY AS DIRECTED, Disp: , Rfl:     carvedilol (COREG) 3.125 MG tablet, Take 1 tablet by mouth 2 (Two) Times a Day With Meals., Disp: 180 tablet, Rfl: 3    clopidogrel (PLAVIX) 75 MG tablet, Take 1 tablet by mouth Daily., Disp: 90 tablet, Rfl: 3    docusate sodium (COLACE) 100 MG capsule,  Take 1 capsule by mouth Daily As Needed for Constipation., Disp: 90 capsule, Rfl: 2    fenofibrate (TRICOR) 48 MG tablet, Take 1 tablet by mouth Daily. for cholesterol, Disp: , Rfl:     gabapentin (NEURONTIN) 400 MG capsule, TAKE ONE CAPSULE BY MOUTH THREE TIMES A DAY AS NEEDED FOR NERVE PAIN, Disp: , Rfl:     Inclisiran Sodium (Leqvio) 284 MG/1.5ML solution prefilled syringe, Inject 1.5 mL under the skin into the appropriate area as directed Every 6 (Six) Months., Disp: 1.5 mL, Rfl: 0    lisinopril (PRINIVIL,ZESTRIL) 20 MG tablet, Take 1 tablet by mouth Daily., Disp: 90 tablet, Rfl: 3    ondansetron ODT (ZOFRAN-ODT) 4 MG disintegrating tablet, Place 1 tablet under the tongue 2 (Two) Times a Day As Needed for Nausea., Disp: , Rfl:     pantoprazole (PROTONIX) 40 MG EC tablet, Take 1 tablet by mouth Daily., Disp: , Rfl:     tadalafil (Cialis) 10 MG tablet, Take 1 tablet by mouth Daily As Needed for Erectile Dysfunction. Do not take more than 2 tabs in 24 hour period, Disp: 20 tablet, Rfl: 11    vitamin D (ERGOCALCIFEROL) 1.25 MG (17688 UT) capsule capsule, Take 1 capsule by mouth 1 (One) Time Per Week., Disp: 5 capsule, Rfl: 2    nicotine (Nicoderm CQ) 21 MG/24HR patch, Place 1 patch on the skin as directed by provider Daily. (Patient not taking: Reported on 11/2/2023), Disp: 28 patch, Rfl: 3    Drug Interactions  None with Inclisiran    Relevant Laboratory Values  Lab Results   Component Value Date    CHOL 105 07/05/2023    CHLPL 163 03/05/2016    TRIG 176 (H) 07/05/2023    HDL 43 07/05/2023    LDL 33 07/05/2023       Medication Assessment & Plan    Administered Leqvio 284mg SUBQ in back of LEFT arm.   Medication education provided, including:   Common Adverse Effects: Injection site reactions, arthralgias, UTIs, diarrhea, bronchitis, pain in extremity, and dyspnea.  Potential for allergic reaction.  Go to ED/call 911 with any S/Sx of allergic reaction.   Must be administered by a HCP.  If a dose is missed, please  call to reschedule.   Expect LDL reduction, to help reduce cardiovascular risk.   At each visit, patient will need to stay a minimum of 15 min for monitoring   Patient will get lipid panel next week.  Patient will continue regular follow-up with cardiology  Will follow up in 6 months for next injection.     Megan Benjamin RPH  11/2/2023  09:26 EDT

## 2024-02-14 DIAGNOSIS — I10 ESSENTIAL HYPERTENSION: Chronic | ICD-10-CM

## 2024-02-14 RX ORDER — LISINOPRIL 20 MG/1
20 TABLET ORAL DAILY
Qty: 90 TABLET | Refills: 3 | Status: SHIPPED | OUTPATIENT
Start: 2024-02-14

## 2024-04-16 DIAGNOSIS — E78.2 MIXED HYPERLIPIDEMIA: Chronic | ICD-10-CM

## 2024-04-16 RX ORDER — ATORVASTATIN CALCIUM 80 MG/1
80 TABLET, FILM COATED ORAL DAILY
Qty: 90 TABLET | Refills: 3 | Status: SHIPPED | OUTPATIENT
Start: 2024-04-16

## 2024-04-25 ENCOUNTER — SPECIALTY PHARMACY (OUTPATIENT)
Dept: PHARMACY | Facility: HOSPITAL | Age: 59
End: 2024-04-25
Payer: MEDICAID

## 2024-04-25 NOTE — PROGRESS NOTES
Patient: Philomena Elizalde    Procedure(s):  LAPAROSCOPIC BILATERAL SALPINGO-OOPHORECTOMY TORSION RIGHT OVARY - Wound Class: II-Clean Contaminated    Diagnosis:ovarian torsion  Diagnosis Additional Information: Pre-operative diagnosis:  ovarian torsion   Post-operative diagnosis  Same  2. Abnormal appearance of left ovary  3. Adhesions of left colon   Procedure:  Procedure(s):  Single site bilateral salpingooophorectomy and ADRIANNA             Anesthesia Type:  General    Note:  Anesthesia Post Evaluation    Patient location during evaluation: PACU  Patient participation: Able to fully participate in evaluation  Level of consciousness: awake and alert  Pain management: adequate  Airway patency: patent  Cardiovascular status: acceptable  Respiratory status: acceptable  Hydration status: acceptable  PONV: none     Anesthetic complications: None          Last vitals:  Filed Vitals:    02/11/17 1337 02/11/17 1407 02/11/17 1512   BP: 107/60 105/59 121/65   Pulse:      Temp: 97.4  F (36.3  C)  98.1  F (36.7  C)   Resp: 16 16 16   SpO2: 95% 95% 95%         Electronically Signed By: Schuyler Corcoran MD  February 11, 2017  5:37 PM   Specialty Pharmacy Refill Coordination Note     Claude is a 58 y.o. male contacted today regarding refills of  Leqvio specialty medication(s).    Reviewed and verified with patient:       Specialty medication(s) and dose(s) confirmed: yes    Refill Questions      Flowsheet Row Most Recent Value   Changes to allergies? No   Changes to medications? No   New conditions or infections since last clinic visit No   Unplanned office visit, urgent care, ED, or hospital admission in the last 4 weeks  No   How does patient/caregiver feel medication is working? Very good   Financial problems or insurance changes  No   Since the previous refill, were any specialty medication doses or scheduled injections missed or delayed?  No   Does this patient require a clinical escalation to a pharmacist? No            Delivery Questions      Flowsheet Row Most Recent Value   Copay verified? Yes   Copay amount 0   Copay form of payment No copayment ($0)                   Follow-up: 180 day(s)     Kathy Snow, Pharmacy Technician  Specialty Pharmacy Technician

## 2024-04-30 ENCOUNTER — DISEASE STATE MANAGEMENT VISIT (OUTPATIENT)
Dept: PHARMACY | Facility: HOSPITAL | Age: 59
End: 2024-04-30
Payer: MEDICAID

## 2024-04-30 ENCOUNTER — SPECIALTY PHARMACY (OUTPATIENT)
Dept: PHARMACY | Facility: HOSPITAL | Age: 59
End: 2024-04-30
Payer: MEDICAID

## 2024-04-30 RX ORDER — INCLISIRAN 284 MG/1.5ML
284 INJECTION, SOLUTION SUBCUTANEOUS
Qty: 1.5 ML | Refills: 0 | Status: SHIPPED | OUTPATIENT
Start: 2024-04-30

## 2024-04-30 NOTE — PROGRESS NOTES
[facility]  Lipid Management Program - Leqvio (Inclisiran)     Claude E Campbell is a 58 y.o. male referred to the [facility] by Opal Ho for clinical pharmacy and specialty pharmacy management of Inclisiran.  Claude E Campbell is  treated for clinical ASCVD and currently takes atorvastatin 80mg and fenofibrate.    Patients reports tolerating Leqvio well without any issues. He denies any side effects and has not missed any doses.    Relevant Past Medical History and Comorbidities  Past Medical History:   Diagnosis Date    Coronary artery disease     Elevated cholesterol     Hypertension     Myocardial infarction      Social History     Socioeconomic History    Marital status:    Tobacco Use    Smoking status: Every Day     Current packs/day: 1.00     Average packs/day: 1 pack/day for 20.0 years (20.0 ttl pk-yrs)     Types: Cigarettes    Smokeless tobacco: Current     Types: Snuff   Vaping Use    Vaping status: Never Used   Substance and Sexual Activity    Alcohol use: Yes     Alcohol/week: 6.0 standard drinks of alcohol     Types: 6 Cans of beer per week     Comment: COUPLE PER DAY     Drug use: No    Sexual activity: Defer       Allergies  Patient has no known allergies.    Current Medication List    Current Outpatient Medications:     amLODIPine (NORVASC) 5 MG tablet, Take 1 tablet by mouth Daily. for blood pressure, Disp: 90 tablet, Rfl: 3    atorvastatin (LIPITOR) 80 MG tablet, Take 1 tablet by mouth Daily., Disp: 90 tablet, Rfl: 3    buprenorphine-naloxone (SUBOXONE) 8-2 MG per SL tablet, PLACE 2 TABLETS UNDER THE TONGUE AND LET DISSOLVE EVERY DAY AS DIRECTED, Disp: , Rfl:     carvedilol (COREG) 3.125 MG tablet, Take 1 tablet by mouth 2 (Two) Times a Day With Meals., Disp: 180 tablet, Rfl: 3    clopidogrel (PLAVIX) 75 MG tablet, Take 1 tablet by mouth Daily., Disp: 90 tablet, Rfl: 3    docusate sodium (COLACE) 100 MG capsule, Take 1 capsule by mouth Daily As  Needed for Constipation., Disp: 90 capsule, Rfl: 2    fenofibrate (TRICOR) 48 MG tablet, Take 1 tablet by mouth Daily. for cholesterol, Disp: , Rfl:     gabapentin (NEURONTIN) 400 MG capsule, TAKE ONE CAPSULE BY MOUTH THREE TIMES A DAY AS NEEDED FOR NERVE PAIN, Disp: , Rfl:     lisinopril (PRINIVIL,ZESTRIL) 20 MG tablet, Take 1 tablet by mouth Daily., Disp: 90 tablet, Rfl: 3    nicotine (Nicoderm CQ) 21 MG/24HR patch, Place 1 patch on the skin as directed by provider Daily. (Patient not taking: Reported on 11/2/2023), Disp: 28 patch, Rfl: 3    ondansetron ODT (ZOFRAN-ODT) 4 MG disintegrating tablet, Place 1 tablet under the tongue 2 (Two) Times a Day As Needed for Nausea., Disp: , Rfl:     pantoprazole (PROTONIX) 40 MG EC tablet, Take 1 tablet by mouth Daily., Disp: , Rfl:     tadalafil (Cialis) 10 MG tablet, Take 1 tablet by mouth Daily As Needed for Erectile Dysfunction. Do not take more than 2 tabs in 24 hour period, Disp: 20 tablet, Rfl: 11    vitamin D (ERGOCALCIFEROL) 1.25 MG (44069 UT) capsule capsule, Take 1 capsule by mouth 1 (One) Time Per Week., Disp: 5 capsule, Rfl: 2    Drug Interactions  None with Inclisiran    Relevant Laboratory Values  Lab Results   Component Value Date    CHOL 105 07/05/2023    CHLPL 163 03/05/2016    TRIG 176 (H) 07/05/2023    HDL 43 07/05/2023    LDL 33 07/05/2023       Medication Assessment & Plan    Administered Leqvio 284mg SUBQ in back of RIGHT arm.   1st dose: 2/17/23  2nd dose: 5/18/23  3rd dose: 11/2/23  4th dose: 4/30/24  Medication education provided at initial appointment.  Most recent LDL Was 33 on 7/5/23.  Patient will continue regular follow-up with cardiology. Next appointment scheduled for 5/17/24.  Will follow up in 6 months for next injection.     Janis Marinelli, PharmD  4/30/2024  09:31 EDT

## 2024-04-30 NOTE — PROGRESS NOTES
[facility]  Lipid Management Program - Leqvio (Inclisiran)     Claude E Campbell is a 58 y.o. male referred to the [facility] by Opal Ho for clinical pharmacy and specialty pharmacy management of Inclisiran.  Claude E Campbell is  treated for clinical ASCVD and currently takes atorvastatin 80mg and fenofibrate.    Patients reports tolerating Leqvio well without any issues. He denies any side effects and has not missed any doses.    Relevant Past Medical History and Comorbidities  Past Medical History:   Diagnosis Date    Coronary artery disease     Elevated cholesterol     Hypertension     Myocardial infarction      Social History     Socioeconomic History    Marital status:    Tobacco Use    Smoking status: Every Day     Current packs/day: 1.00     Average packs/day: 1 pack/day for 20.0 years (20.0 ttl pk-yrs)     Types: Cigarettes    Smokeless tobacco: Current     Types: Snuff   Vaping Use    Vaping status: Never Used   Substance and Sexual Activity    Alcohol use: Yes     Alcohol/week: 6.0 standard drinks of alcohol     Types: 6 Cans of beer per week     Comment: COUPLE PER DAY     Drug use: No    Sexual activity: Defer       Allergies  Patient has no known allergies.    Current Medication List    Current Outpatient Medications:     amLODIPine (NORVASC) 5 MG tablet, Take 1 tablet by mouth Daily. for blood pressure, Disp: 90 tablet, Rfl: 3    atorvastatin (LIPITOR) 80 MG tablet, Take 1 tablet by mouth Daily., Disp: 90 tablet, Rfl: 3    buprenorphine-naloxone (SUBOXONE) 8-2 MG per SL tablet, PLACE 2 TABLETS UNDER THE TONGUE AND LET DISSOLVE EVERY DAY AS DIRECTED, Disp: , Rfl:     carvedilol (COREG) 3.125 MG tablet, Take 1 tablet by mouth 2 (Two) Times a Day With Meals., Disp: 180 tablet, Rfl: 3    clopidogrel (PLAVIX) 75 MG tablet, Take 1 tablet by mouth Daily., Disp: 90 tablet, Rfl: 3    docusate sodium (COLACE) 100 MG capsule, Take 1 capsule by mouth Daily As  Needed for Constipation., Disp: 90 capsule, Rfl: 2    fenofibrate (TRICOR) 48 MG tablet, Take 1 tablet by mouth Daily. for cholesterol, Disp: , Rfl:     gabapentin (NEURONTIN) 400 MG capsule, TAKE ONE CAPSULE BY MOUTH THREE TIMES A DAY AS NEEDED FOR NERVE PAIN, Disp: , Rfl:     Inclisiran Sodium (Leqvio) 284 MG/1.5ML solution prefilled syringe, Inject 1.5 mL under the skin into the appropriate area as directed Every 6 (Six) Months., Disp: 1.5 mL, Rfl: 0    lisinopril (PRINIVIL,ZESTRIL) 20 MG tablet, Take 1 tablet by mouth Daily., Disp: 90 tablet, Rfl: 3    ondansetron ODT (ZOFRAN-ODT) 4 MG disintegrating tablet, Place 1 tablet under the tongue 2 (Two) Times a Day As Needed for Nausea., Disp: , Rfl:     pantoprazole (PROTONIX) 40 MG EC tablet, Take 1 tablet by mouth Daily., Disp: , Rfl:     tadalafil (Cialis) 10 MG tablet, Take 1 tablet by mouth Daily As Needed for Erectile Dysfunction. Do not take more than 2 tabs in 24 hour period, Disp: 20 tablet, Rfl: 11    vitamin D (ERGOCALCIFEROL) 1.25 MG (79698 UT) capsule capsule, Take 1 capsule by mouth 1 (One) Time Per Week., Disp: 5 capsule, Rfl: 2    Drug Interactions  None with Inclisiran    Relevant Laboratory Values  Lab Results   Component Value Date    CHOL 105 07/05/2023    CHLPL 163 03/05/2016    TRIG 176 (H) 07/05/2023    HDL 43 07/05/2023    LDL 33 07/05/2023       Medication Assessment & Plan    Administered Leqvio 284mg SUBQ in back of RIGHT arm.   1st dose: 2/17/23  2nd dose: 5/18/23  3rd dose: 11/2/23  4th dose: 4/30/24  Medication education provided at initial appointment.  Most recent LDL Was 33 on 7/5/23.  Patient will continue regular follow-up with cardiology. Next appointment scheduled for 5/17/24.  Will follow up in 6 months for next injection.     Janis Marinelli, [place]  4/30/2024  09:51 EDT

## 2024-05-17 ENCOUNTER — OFFICE VISIT (OUTPATIENT)
Dept: CARDIOLOGY | Facility: CLINIC | Age: 59
End: 2024-05-17
Payer: MEDICAID

## 2024-05-17 VITALS
OXYGEN SATURATION: 95 % | HEART RATE: 56 BPM | HEIGHT: 72 IN | WEIGHT: 193 LBS | DIASTOLIC BLOOD PRESSURE: 85 MMHG | BODY MASS INDEX: 26.14 KG/M2 | SYSTOLIC BLOOD PRESSURE: 138 MMHG

## 2024-05-17 DIAGNOSIS — I25.10 CORONARY ARTERY DISEASE INVOLVING NATIVE CORONARY ARTERY OF NATIVE HEART WITHOUT ANGINA PECTORIS: Chronic | ICD-10-CM

## 2024-05-17 DIAGNOSIS — E78.5 DYSLIPIDEMIA, GOAL LDL BELOW 70: Chronic | ICD-10-CM

## 2024-05-17 DIAGNOSIS — I10 ESSENTIAL HYPERTENSION: Primary | Chronic | ICD-10-CM

## 2024-05-17 PROCEDURE — 99214 OFFICE O/P EST MOD 30 MIN: CPT | Performed by: NURSE PRACTITIONER

## 2024-05-17 PROCEDURE — 1160F RVW MEDS BY RX/DR IN RCRD: CPT | Performed by: NURSE PRACTITIONER

## 2024-05-17 PROCEDURE — 1159F MED LIST DOCD IN RCRD: CPT | Performed by: NURSE PRACTITIONER

## 2024-05-17 PROCEDURE — 3075F SYST BP GE 130 - 139MM HG: CPT | Performed by: NURSE PRACTITIONER

## 2024-05-17 PROCEDURE — 3079F DIAST BP 80-89 MM HG: CPT | Performed by: NURSE PRACTITIONER

## 2024-05-17 NOTE — PROGRESS NOTES
"Chief Complaint  Follow-up (Patient states he is doing good )    Subjective          Claude E Campbell presents to [facility] for follow up.    History of Present Illness    Claude was last seen in clinic on 1/19/2023.  From a cardiac standpoint he was stable.  We did talk about starting Leqvio and patient agreed.  Referral to the lipid clinic was made.    At today's visit bebo denies any chest pain, palpitations, shortness of breath or dyspnea on exertion.  He does report that he did not take his medications this morning and he does track his blood pressures at home.  He reports the top number usually in 120s and the bottom number is in the 70s and 80s.    Objective     Vital Signs:   /85   Pulse 56   Ht 182.9 cm (72\")   Wt 87.5 kg (193 lb)   SpO2 95%   BMI 26.18 kg/m²       Physical Exam  Constitutional:       Appearance: Normal appearance. He is well-developed.   Cardiovascular:      Rate and Rhythm: Normal rate and regular rhythm.      Heart sounds: No murmur heard.     No friction rub. No gallop.   Pulmonary:      Effort: Pulmonary effort is normal. No respiratory distress.      Breath sounds: Normal breath sounds. No wheezing or rales.   Skin:     General: Skin is warm and dry.   Neurological:      Mental Status: He is alert and oriented to person, place, and time.   Psychiatric:         Mood and Affect: Mood normal.         Behavior: Behavior normal.          Result Review :         Lipid Panel          7/5/2023    09:09   Lipid Panel   Total Cholesterol 105    Triglycerides 176    HDL Cholesterol 43    VLDL Cholesterol 29    LDL Cholesterol  33    LDL/HDL Ratio 0.62               Most recent echocardiogram  Results for orders placed during the hospital encounter of 06/23/20    Adult Transthoracic Echo Complete W/ Cont if Necessary Per Protocol    Interpretation Summary  · Left ventricular wall thickness is consistent with mild concentric hypertrophy.  · Left " ventricular systolic function is preserved, EF 51-55%.  · Mild tricuspid valve regurgitation is present.      Most recent Stress Test  Results for orders placed during the hospital encounter of 06/23/20    Stress Test With Myocardial Perfusion One Day    Interpretation Summary  · Left ventricular ejection fraction is mildly reduced (Calculated EF = 47%).  · Myocardial perfusion imaging indicates a located in the inferior wall and lateral wall with no significant ischemia noted.  · Impressions are consistent with an intermediate risk study.  · Findings consistent with a normal ECG stress test.  · Findings consistent with large infarction in inferolateral segment with minimal arturo-infarction ischemia.  · Medical manangement is appropriate if clinically correlates.       Most recent Cardiac Cath      Current Outpatient Medications   Medication Sig Dispense Refill    amLODIPine (NORVASC) 5 MG tablet Take 1 tablet by mouth Daily. for blood pressure 90 tablet 3    atorvastatin (LIPITOR) 80 MG tablet Take 1 tablet by mouth Daily. 90 tablet 3    buprenorphine-naloxone (SUBOXONE) 8-2 MG per SL tablet PLACE 2 TABLETS UNDER THE TONGUE AND LET DISSOLVE EVERY DAY AS DIRECTED      carvedilol (COREG) 3.125 MG tablet Take 1 tablet by mouth 2 (Two) Times a Day With Meals. 180 tablet 3    docusate sodium (COLACE) 100 MG capsule Take 1 capsule by mouth Daily As Needed for Constipation. 90 capsule 2    gabapentin (NEURONTIN) 400 MG capsule TAKE ONE CAPSULE BY MOUTH THREE TIMES A DAY AS NEEDED FOR NERVE PAIN      Inclisiran Sodium (Leqvio) 284 MG/1.5ML solution prefilled syringe Inject 1.5 mL under the skin into the appropriate area as directed Every 6 (Six) Months. 1.5 mL 0    lisinopril (PRINIVIL,ZESTRIL) 20 MG tablet Take 1 tablet by mouth Daily. 90 tablet 3    ondansetron ODT (ZOFRAN-ODT) 4 MG disintegrating tablet Place 1 tablet under the tongue 2 (Two) Times a Day As Needed for Nausea.      pantoprazole (PROTONIX) 40 MG EC tablet  Take 1 tablet by mouth Daily.      tadalafil (Cialis) 10 MG tablet Take 1 tablet by mouth Daily As Needed for Erectile Dysfunction. Do not take more than 2 tabs in 24 hour period 20 tablet 11    vitamin D (ERGOCALCIFEROL) 1.25 MG (35731 UT) capsule capsule Take 1 capsule by mouth 1 (One) Time Per Week. 5 capsule 2    clopidogrel (PLAVIX) 75 MG tablet Take 1 tablet by mouth Daily. 90 tablet 3     No current facility-administered medications for this visit.            Assessment and Plan    Problem List Items Addressed This Visit          Cardiac and Vasculature    Coronary artery disease  (Chronic)    Overview     3/5/2016 [facility] for MI: PCI ABNER to the RCA  6/4/2020 nuclear stress test: Myocardial perfusion imaging indicates an infarct in the inferior wall and lateral wall with no significant ischemia  6/23/2020 TTE: LVEF 51 to 55%, mild TR, mild concentric hypertrophy         Dyslipidemia, goal LDL below 70 (Chronic)    Overview     6/24/2020 total cholesterol 145, triglycerides 150, HDL 46, and LDL 69  6/30/2021 total cholesterol 156, triglycerides 486, HDL 34, and LDL 50  1/11/2022 total cholesterol 163, triglycerides 288, HDL 36, and LDL 80  2/17/2023 first dose of Leqvio  7/5/2023 total cholesterol 105, triglycerides 176, HDL 43, and LDL 33         Essential hypertension - Primary (Chronic)           Follow Up     Medications were reviewed with the patient.    Coronary artery disease is stable.  Continue atorvastatin, Plavix, lisinopril.  Patient is on beta-blocker due to baseline low heart rate.    Continue Leqvio, atorvastatin for dyslipidemia.    Hypertension is controlled per patient report.  I have asked him to keep a blood pressure log and return it to our office when completed.    Return in about 3 months (around 8/17/2024).    Patient was given instructions and counseling regarding his condition or for health maintenance advice. Please see specific information pulled into the AVS if appropriate.

## 2024-05-20 DIAGNOSIS — I25.10 CORONARY ARTERY DISEASE INVOLVING NATIVE CORONARY ARTERY OF NATIVE HEART WITHOUT ANGINA PECTORIS: Chronic | ICD-10-CM

## 2024-05-20 RX ORDER — CLOPIDOGREL BISULFATE 75 MG/1
75 TABLET ORAL DAILY
Qty: 90 TABLET | Refills: 3 | Status: SHIPPED | OUTPATIENT
Start: 2024-05-20

## 2024-10-21 ENCOUNTER — SPECIALTY PHARMACY (OUTPATIENT)
Dept: PHARMACY | Facility: HOSPITAL | Age: 59
End: 2024-10-21
Payer: MEDICAID

## 2024-10-21 NOTE — PROGRESS NOTES
Specialty Pharmacy Refill Coordination Note     Claude is a 59 y.o. male contacted today regarding refills of  Leqvio specialty medication(s).    Reviewed and verified with patient:       Specialty medication(s) and dose(s) confirmed: yes    Refill Questions      Flowsheet Row Most Recent Value   Changes to allergies? No   Changes to medications? No   New conditions or infections since last clinic visit No   Unplanned office visit, urgent care, ED, or hospital admission in the last 4 weeks  No   How does patient/caregiver feel medication is working? Good   Financial problems or insurance changes  No   Since the previous refill, were any specialty medication doses or scheduled injections missed or delayed?  No   Does this patient require a clinical escalation to a pharmacist? No            Delivery Questions      Flowsheet Row Most Recent Value   Delivery method Other (Comment)  [in clinic]   Delivery address Prescription   Medication(s) being filled and delivered Inclisiran Sodium (Leqvio)   Copay verified? Yes   Copay amount 0   Copay form of payment No copayment ($0)   Signature Required No                   Follow-up: 180 day(s)     Kathy Snow, Pharmacy Technician  Specialty Pharmacy Technician

## 2024-10-25 ENCOUNTER — SPECIALTY PHARMACY (OUTPATIENT)
Dept: PHARMACY | Facility: HOSPITAL | Age: 59
End: 2024-10-25
Payer: MEDICAID

## 2024-10-25 ENCOUNTER — DISEASE STATE MANAGEMENT VISIT (OUTPATIENT)
Dept: PHARMACY | Facility: HOSPITAL | Age: 59
End: 2024-10-25
Payer: MEDICAID

## 2024-10-25 DIAGNOSIS — E78.5 DYSLIPIDEMIA, GOAL LDL BELOW 70: Primary | Chronic | ICD-10-CM

## 2024-10-25 RX ORDER — INCLISIRAN 284 MG/1.5ML
284 INJECTION, SOLUTION SUBCUTANEOUS
Qty: 1.5 ML | Refills: 1 | Status: SHIPPED | OUTPATIENT
Start: 2024-10-25 | End: 2024-10-29 | Stop reason: SDUPTHER

## 2024-10-25 NOTE — PROGRESS NOTES
[facility]ication Management Clinic  Lipid Management Program - Leqvio (Inclisiran)     Claude E Campbell  is a 59 y.o. male referred by their provider, Opal Ho, to the Hyperlipidemia Patient Management program offered by [facility] for Lipid Management.  Claude E Campbell is  treated for ASCVD and currently takes Leqvio and atorvastatin 80mg.  He denies any problems or side effects with last injection.     A Follow-up outreach was conducted, including assessment of therapy appropriateness and specialty medication education for LEQVIO (inclisiran). The patient was introduced to services offered by [facility], including: regular assessments, refill coordination, curbside pick-up or mail order delivery options, prior authorization maintenance, and financial assistance programs as applicable. The patient was also provided with contact information for the pharmacy team.     Insurance Coverage & Financial Support  [place] Medicaid     Relevant Past Medical History and Comorbidities  Relevant medical history and concomitant health conditions were discussed with the patient. The patient's chart has been reviewed for relevant past medical history and comorbid conditions and updated as necessary.  Past Medical History:   Diagnosis Date    Coronary artery disease     Elevated cholesterol     Hypertension     Myocardial infarction      Social History     Socioeconomic History    Marital status:    Tobacco Use    Smoking status: Every Day     Current packs/day: 1.00     Average packs/day: 1 pack/day for 20.0 years (20.0 ttl pk-yrs)     Types: Cigarettes    Smokeless tobacco: Current     Types: Snuff   Vaping Use    Vaping status: Never Used   Substance and Sexual Activity    Alcohol use: Yes     Alcohol/week: 6.0 standard drinks of alcohol     Types: 6 Cans of beer per week     Comment: COUPLE PER DAY     Drug use: No    Sexual activity: Defer             Allergies  Known allergies and reactions were discussed with the patient. The patient's chart has been reviewed for  allergy information and updated as necessary.   No Known Allergies         Relevant Laboratory Values  Relevant laboratory values were discussed with the patient.   Lab Results   Component Value Date    CHOL 105 07/05/2023    CHLPL 163 03/05/2016    TRIG 176 (H) 07/05/2023    HDL 43 07/05/2023    LDL 33 07/05/2023       Current Medication List  This medication list has been reviewed with the patient and evaluated for any interactions or necessary modifications/recommendations, and updated to include all prescription medications, OTC medications, and supplements the patient is currently taking.  This list reflects what is contained in the patient's profile, which has also been marked as reviewed to communicate to other providers it is the most up to date version of the patient's current medication therapy.     Current Outpatient Medications:     Inclisiran Sodium (Leqvio) 284 MG/1.5ML solution prefilled syringe, Inject 1.5 mL under the skin into the appropriate area as directed Every 6 (Six) Months., Disp: 1.5 mL, Rfl: 1    amLODIPine (NORVASC) 5 MG tablet, Take 1 tablet by mouth Daily. for blood pressure, Disp: 90 tablet, Rfl: 3    atorvastatin (LIPITOR) 80 MG tablet, Take 1 tablet by mouth Daily., Disp: 90 tablet, Rfl: 3    buprenorphine-naloxone (SUBOXONE) 8-2 MG per SL tablet, PLACE 2 TABLETS UNDER THE TONGUE AND LET DISSOLVE EVERY DAY AS DIRECTED, Disp: , Rfl:     carvedilol (COREG) 3.125 MG tablet, Take 1 tablet by mouth 2 (Two) Times a Day With Meals., Disp: 180 tablet, Rfl: 3    clopidogrel (PLAVIX) 75 MG tablet, Take 1 tablet by mouth Daily., Disp: 90 tablet, Rfl: 3    docusate sodium (COLACE) 100 MG capsule, Take 1 capsule by mouth Daily As Needed for Constipation., Disp: 90 capsule, Rfl: 2    gabapentin (NEURONTIN) 400 MG capsule, TAKE ONE CAPSULE BY MOUTH THREE TIMES A DAY AS  NEEDED FOR NERVE PAIN, Disp: , Rfl:     lisinopril (PRINIVIL,ZESTRIL) 20 MG tablet, Take 1 tablet by mouth Daily., Disp: 90 tablet, Rfl: 3    pantoprazole (PROTONIX) 40 MG EC tablet, Take 1 tablet by mouth Daily., Disp: , Rfl:     tadalafil (Cialis) 10 MG tablet, Take 1 tablet by mouth Daily As Needed for Erectile Dysfunction. Do not take more than 2 tabs in 24 hour period, Disp: 20 tablet, Rfl: 11         Drug Interactions  None with Leqvio    Goals of Therapy  Goals related to the patient's specialty therapy were discussed with the patient. The Patient Goals segment of this outreach has been reviewed and updated.   Goals Addressed Today    None         Medication Assessment & Plan  Patient will begin Leqvio 284mg SC every 6 months.  Juanita CruzD Administered Leqvio 284mg SUBQ in back of right arm.     1st dose: 2/17/23  2nd dose: 5/18/23  3rd dose: 11/2/23  4th dose: 4/30/24  5th Dose: 10/25/24  Medication education provided at initial visit.   LDL previously at goal. Lipid panel will need to be rechecked. Order placed today.   Care Coordinator to set up future refill outreaches, coordinate prescription delivery, and escalate clinical questions to pharmacist.  Welcome information and patient satisfaction survey to be sent by specialty pharmacy team with patient's initial fill.  Patient will continue regular follow-up with cardiology  Will follow up in 6 months for next injection. Pharmacist to perform regular assessments no more than (6) months from the previous assessment.     Attestation      I attest the patient was actively involved in and has agreed to the above plan of care. If the prescribed therapy is at any point deemed not appropriate based on the current or future assessments, a consultation will be initiated with the patient's specialty care provider to determine the best course of action. The revised plan of therapy will be documented along with any required assessments and/or additional  patient education provided.       Lucille Huff, PharmD  10/25/2024  10:47 EDT

## 2024-10-29 ENCOUNTER — OFFICE VISIT (OUTPATIENT)
Dept: CARDIOLOGY | Facility: CLINIC | Age: 59
End: 2024-10-29
Payer: MEDICAID

## 2024-10-29 VITALS
BODY MASS INDEX: 25.33 KG/M2 | HEIGHT: 72 IN | DIASTOLIC BLOOD PRESSURE: 87 MMHG | OXYGEN SATURATION: 97 % | HEART RATE: 66 BPM | WEIGHT: 187 LBS | SYSTOLIC BLOOD PRESSURE: 163 MMHG

## 2024-10-29 DIAGNOSIS — Z72.0 TOBACCO USE: Chronic | ICD-10-CM

## 2024-10-29 DIAGNOSIS — E78.1 HYPERTRIGLYCERIDEMIA: Chronic | ICD-10-CM

## 2024-10-29 DIAGNOSIS — I10 ESSENTIAL HYPERTENSION: Chronic | ICD-10-CM

## 2024-10-29 DIAGNOSIS — E78.2 MIXED HYPERLIPIDEMIA: Chronic | ICD-10-CM

## 2024-10-29 DIAGNOSIS — I25.10 CORONARY ARTERY DISEASE INVOLVING NATIVE CORONARY ARTERY OF NATIVE HEART WITHOUT ANGINA PECTORIS: Primary | Chronic | ICD-10-CM

## 2024-10-29 DIAGNOSIS — E78.5 DYSLIPIDEMIA, GOAL LDL BELOW 70: Chronic | ICD-10-CM

## 2024-10-29 PROCEDURE — 1159F MED LIST DOCD IN RCRD: CPT | Performed by: NURSE PRACTITIONER

## 2024-10-29 PROCEDURE — 3079F DIAST BP 80-89 MM HG: CPT | Performed by: NURSE PRACTITIONER

## 2024-10-29 PROCEDURE — 1160F RVW MEDS BY RX/DR IN RCRD: CPT | Performed by: NURSE PRACTITIONER

## 2024-10-29 PROCEDURE — 99214 OFFICE O/P EST MOD 30 MIN: CPT | Performed by: NURSE PRACTITIONER

## 2024-10-29 PROCEDURE — 93000 ELECTROCARDIOGRAM COMPLETE: CPT | Performed by: NURSE PRACTITIONER

## 2024-10-29 PROCEDURE — 3077F SYST BP >= 140 MM HG: CPT | Performed by: NURSE PRACTITIONER

## 2024-10-29 RX ORDER — INCLISIRAN 284 MG/1.5ML
284 INJECTION, SOLUTION SUBCUTANEOUS
Start: 2024-10-29

## 2024-10-29 RX ORDER — CARVEDILOL 6.25 MG/1
6.25 TABLET ORAL 2 TIMES DAILY WITH MEALS
Qty: 180 TABLET | Refills: 2 | Status: SHIPPED | OUTPATIENT
Start: 2024-10-29

## 2024-10-29 RX ORDER — ATORVASTATIN CALCIUM 80 MG/1
80 TABLET, FILM COATED ORAL DAILY
Qty: 90 TABLET | Refills: 3 | Status: SHIPPED | OUTPATIENT
Start: 2024-10-29

## 2024-10-29 RX ORDER — LISINOPRIL 30 MG/1
30 TABLET ORAL DAILY
Qty: 90 TABLET | Refills: 2 | Status: SHIPPED | OUTPATIENT
Start: 2024-10-29

## 2024-10-29 RX ORDER — AMLODIPINE BESYLATE 5 MG/1
5 TABLET ORAL DAILY
Qty: 90 TABLET | Refills: 3 | Status: SHIPPED | OUTPATIENT
Start: 2024-10-29

## 2024-10-29 RX ORDER — CLOPIDOGREL BISULFATE 75 MG/1
75 TABLET ORAL DAILY
Qty: 90 TABLET | Refills: 3 | Status: SHIPPED | OUTPATIENT
Start: 2024-10-29

## 2024-10-29 NOTE — PROGRESS NOTES
"Chief Complaint  Follow-up (High blood pressure , denies chest pain )    Subjective          Claude E Campbell presents to [facility] for follow up.    History of Present Illness  History of Present Illness  The patient is a 59-year-old male who follows up in our office with coronary artery disease, dyslipidemia, and hypertension. He was last seen in the clinic 3 months ago.  His blood pressure was elevated and he was asked to keep a blood pressure log    He reports feeling well overall but has not been monitoring his blood pressure at home. He believes his blood pressure to be elevated, noting it was around 140 when checked during his visit to his primary care physician.     He acknowledges not maintaining a healthy diet and experiencing significant stress at work. He has been consuming a lot of microwave meals recently and has stopped snacking on fruits.  He plans to have his cholesterol levels checked on Friday.    He is considering purchasing a blood pressure monitor from [employer]. He recognizes the need to quit smoking and increase his physical activity. He used to enjoy running in his youth but now experiences shortness of breath with excessive activity.    SOCIAL HISTORY  He works in [email or web address]. He smokes.         Objective     Vital Signs:   /87 (BP Location: Left arm, Patient Position: Sitting, Cuff Size: Adult)   Pulse 66   Ht 182.9 cm (72\")   Wt 84.8 kg (187 lb)   SpO2 97%   BMI 25.36 kg/m²       Physical Exam  Vitals reviewed.   Constitutional:       Appearance: Normal appearance. He is well-developed.   Cardiovascular:      Rate and Rhythm: Normal rate and regular rhythm.      Heart sounds: No murmur heard.     No friction rub. No gallop.   Pulmonary:      Effort: Pulmonary effort is normal. No respiratory distress.      Breath sounds: Examination of the right-upper field reveals wheezing. Examination of the right-middle field reveals wheezing. Examination of the " left-lower field reveals wheezing. Rhonchi present. No wheezing or rales.   Skin:     General: Skin is warm and dry.   Neurological:      Mental Status: He is alert and oriented to person, place, and time.   Psychiatric:         Mood and Affect: Mood normal.         Behavior: Behavior normal.          Result Review :                ECG 12 Lead    Date/Time: 10/29/2024 3:49 PM  Performed by: Opal Ho APRN    Authorized by: Opal Ho APRN  Comparison: compared with previous ECG from 6/24/2020  Rhythm: sinus rhythm  Rate: normal  BPM: 70  Q waves: II, III and aVF    T inversion: III  T flattening: V5 and V6  Other findings: T wave abnormality  Comments: QTc 401           Most recent echocardiogram  Results for orders placed during the hospital encounter of 06/23/20    Adult Transthoracic Echo Complete W/ Cont if Necessary Per Protocol    Interpretation Summary  · Left ventricular wall thickness is consistent with mild concentric hypertrophy.  · Left ventricular systolic function is preserved, EF 51-55%.  · Mild tricuspid valve regurgitation is present.      Most recent Stress Test  Results for orders placed during the hospital encounter of 06/23/20    Stress Test With Myocardial Perfusion One Day    Interpretation Summary  · Left ventricular ejection fraction is mildly reduced (Calculated EF = 47%).  · Myocardial perfusion imaging indicates a located in the inferior wall and lateral wall with no significant ischemia noted.  · Impressions are consistent with an intermediate risk study.  · Findings consistent with a normal ECG stress test.  · Findings consistent with large infarction in inferolateral segment with minimal arturo-infarction ischemia.  · Medical manangement is appropriate if clinically correlates.       Most recent Cardiac Cath      Current Outpatient Medications   Medication Sig Dispense Refill    amLODIPine (NORVASC) 5 MG tablet Take 1 tablet by mouth Daily. for blood pressure 90 tablet 3     atorvastatin (LIPITOR) 80 MG tablet Take 1 tablet by mouth Daily. 90 tablet 3    buprenorphine-naloxone (SUBOXONE) 8-2 MG per SL tablet PLACE 2 TABLETS UNDER THE TONGUE AND LET DISSOLVE EVERY DAY AS DIRECTED      carvedilol (COREG) 6.25 MG tablet Take 1 tablet by mouth 2 (Two) Times a Day With Meals. 180 tablet 2    clopidogrel (PLAVIX) 75 MG tablet Take 1 tablet by mouth Daily. 90 tablet 3    docusate sodium (COLACE) 100 MG capsule Take 1 capsule by mouth Daily As Needed for Constipation. 90 capsule 2    gabapentin (NEURONTIN) 400 MG capsule TAKE ONE CAPSULE BY MOUTH THREE TIMES A DAY AS NEEDED FOR NERVE PAIN      Inclisiran Sodium (Leqvio) 284 MG/1.5ML solution prefilled syringe Inject 1.5 mL under the skin into the appropriate area as directed Every 6 (Six) Months.      lisinopril (PRINIVIL,ZESTRIL) 30 MG tablet Take 1 tablet by mouth Daily. 90 tablet 2    tadalafil (Cialis) 10 MG tablet Take 1 tablet by mouth Daily As Needed for Erectile Dysfunction. Do not take more than 2 tabs in 24 hour period 20 tablet 11     No current facility-administered medications for this visit.            Assessment and Plan    Problem List Items Addressed This Visit          Cardiac and Vasculature    Coronary artery disease  - Primary (Chronic)    Overview     3/5/2016 [facility] for MI: PCI ABNER to the RCA  6/4/2020 nuclear stress test: Myocardial perfusion imaging indicates an infarct in the inferior wall and lateral wall with no significant ischemia  6/23/2020 [facility]TE: LVEF 51 to 55%, mild TR, mild concentric hypertrophy         Relevant Medications    amLODIPine (NORVASC) 5 MG tablet    carvedilol (COREG) 6.25 MG tablet    clopidogrel (PLAVIX) 75 MG tablet    Other Relevant Orders    Adult Transthoracic Echo Complete W/ Cont if Necessary Per Protocol    ECG 12 Lead    Dyslipidemia, goal LDL below 55 (Chronic)    Overview     6/24/2020 total cholesterol 145, triglycerides 150, HDL 46, and LDL 69  6/30/2021 total cholesterol 156,  triglycerides 486, HDL 34, and LDL 50  1/11/2022 total cholesterol 163, triglycerides 288, HDL 36, and LDL 80  2/17/2023 first dose of Leqvio  7/5/2023 total cholesterol 105, triglycerides 176, HDL 43, and LDL 33         Relevant Medications    atorvastatin (LIPITOR) 80 MG tablet    Inclisiran Sodium (Leqvio) 284 MG/1.5ML solution prefilled syringe    Essential hypertension (Chronic)    Relevant Medications    amLODIPine (NORVASC) 5 MG tablet    carvedilol (COREG) 6.25 MG tablet    lisinopril (PRINIVIL,ZESTRIL) 30 MG tablet    Hypertriglyceridemia (Chronic)    Relevant Medications    atorvastatin (LIPITOR) 80 MG tablet    Inclisiran Sodium (Leqvio) 284 MG/1.5ML solution prefilled syringe       Tobacco    Tobacco use (Chronic)     Other Visit Diagnoses       Mixed hyperlipidemia  (Chronic)       Relevant Medications    atorvastatin (LIPITOR) 80 MG tablet    Inclisiran Sodium (Leqvio) 284 MG/1.5ML solution prefilled syringe            Assessment & Plan  1. Hypertension.  He reports feeling his blood pressure has been elevated and mentions a recent reading of 140 mmHg. The dosage of carvedilol will be increased to 6.25 mg, to be taken twice daily. Lisinopril will also be increased to 30 mg, with a new prescription sent to the pharmacy. He is advised to monitor his blood pressure at home, ensuring a rest period of at least 5 minutes prior to measurement. Lifestyle modifications including smoking cessation, regular exercise, and a low-salt diet are recommended. An echocardiogram will be ordered to check for any changes in the heart's condition.    2. Dyslipidemia.  He will have a lipid panel done on Friday to assess his cholesterol levels, as the last panel was done a year ago. He is advised to improve his diet by reducing the intake of microwave meals and increasing the consumption of fruits and healthier options.    3. Coronary Artery Disease.  An echocardiogram will be ordered to monitor the condition of his heart,  particularly the mild concentric hypertrophy noted previously. The last echocardiogram was done 4 years ago, and it is important to ensure there has been no progression.  Continue Plavix, carvedilol, and atorvastatin    Follow-up  Return in 6 months for follow up.         Follow Up     Medications were reviewed with the patient.    Return in about 6 months (around 4/29/2025).    Patient was given instructions and counseling regarding his condition or for health maintenance advice. Please see specific information pulled into the AVS if appropriate.     Patient or patient representative verbalized consent for the use of Ambient Listening during the visit with  VISHAL Keys for chart documentation. 10/29/2024  17:06 EDT

## 2024-11-01 ENCOUNTER — LAB (OUTPATIENT)
Dept: LAB | Facility: HOSPITAL | Age: 59
End: 2024-11-01
Payer: MEDICAID

## 2024-11-01 DIAGNOSIS — E78.5 DYSLIPIDEMIA, GOAL LDL BELOW 70: Chronic | ICD-10-CM

## 2024-11-01 LAB
CHOLEST SERPL-MCNC: 92 MG/DL (ref 0–200)
HDLC SERPL-MCNC: 34 MG/DL (ref 40–60)
LDLC SERPL CALC-MCNC: 23 MG/DL (ref 0–100)
LDLC/HDLC SERPL: 0.35 {RATIO}
TRIGL SERPL-MCNC: 230 MG/DL (ref 0–150)
VLDLC SERPL-MCNC: 35 MG/DL (ref 5–40)

## 2024-11-01 PROCEDURE — 80061 LIPID PANEL: CPT

## 2024-11-01 PROCEDURE — 36415 COLL VENOUS BLD VENIPUNCTURE: CPT

## 2024-11-14 RX ORDER — FENOFIBRATE 145 MG/1
145 TABLET, COATED ORAL DAILY
Qty: 30 TABLET | Refills: 11 | Status: SHIPPED | OUTPATIENT
Start: 2024-11-14

## 2024-11-18 ENCOUNTER — TELEPHONE (OUTPATIENT)
Dept: CARDIOLOGY | Facility: CLINIC | Age: 59
End: 2024-11-18
Payer: MEDICAID

## 2024-11-18 NOTE — TELEPHONE ENCOUNTER
----- Message from Opal Ho sent at 11/14/2024  9:33 AM EST -----  Please call patient about their lab results.    Your cholesterol panel shows that your triglycerides are elevated.  Your triglycerides were 230 and the goal is for them to be less than 150.  On the other hand your LDL cholesterol, which is the bad cholesterol was 23 and this is very good we want this to be less than 55.  I would like you to continue the atorvastatin as well as the inclisiran but I need to add another medication for your elevated triglycerides.  This medication is called fenofibrate.  I have sent it to your pharmacy, [facility] in [place].  If you have any difficulty with this drug cost or if you have side effects when taking it, please call the office to notify us.    Thanks, Opal

## 2024-11-18 NOTE — TELEPHONE ENCOUNTER
Spoke to patient about cholesterol panel results per Opal's request. Patient voiced understanding of results. Patient states he will also adjust his eating habits again. Patient states he has not been eating healthy due to work schedule. He states he will  medication and let us know if he has any issues.

## 2024-11-19 RX ORDER — FENOFIBRATE 145 MG/1
145 TABLET, COATED ORAL DAILY
Qty: 30 TABLET | Refills: 11 | Status: SHIPPED | OUTPATIENT
Start: 2024-11-19

## 2025-04-17 ENCOUNTER — SPECIALTY PHARMACY (OUTPATIENT)
Dept: PHARMACY | Facility: HOSPITAL | Age: 60
End: 2025-04-17
Payer: MEDICAID

## 2025-04-17 ENCOUNTER — DISEASE STATE MANAGEMENT VISIT (OUTPATIENT)
Dept: PHARMACY | Facility: HOSPITAL | Age: 60
End: 2025-04-17
Payer: MEDICAID

## 2025-04-17 RX ORDER — INCLISIRAN 284 MG/1.5ML
284 INJECTION, SOLUTION SUBCUTANEOUS
Qty: 1.5 ML | Refills: 0 | Status: SHIPPED | OUTPATIENT
Start: 2025-04-17

## 2025-04-17 NOTE — PROGRESS NOTES
[facility]  Lipid Management Program - Leqvio (Inclisiran)     Claude E Campbell  is a 59 y.o. male referred by their provider, Opal Ho, to the Hyperlipidemia Patient Management program offered by [facility] for Lipid Management.  Claude E Campbell is treated for ASCVD and currently takes Leqvio, atorvastatin 80mg daily, and fenofibrate.     Patient reports tolerating Leqvio well without any issues. He denies any side effects with previous dose.    Drug Coverage   Medicaid    Relevant Past Medical History and Comorbidities  Past Medical History:   Diagnosis Date    Coronary artery disease     Elevated cholesterol     Hypertension     Myocardial infarction      Social History     Socioeconomic History    Marital status:    Tobacco Use    Smoking status: Every Day     Current packs/day: 1.00     Average packs/day: 1 pack/day for 20.0 years (20.0 ttl pk-yrs)     Types: Cigarettes    Smokeless tobacco: Current     Types: Snuff   Vaping Use    Vaping status: Never Used   Substance and Sexual Activity    Alcohol use: Yes     Alcohol/week: 6.0 standard drinks of alcohol     Types: 6 Cans of beer per week     Comment: COUPLE PER DAY     Drug use: No    Sexual activity: Defer         Allergies  Known allergies and reactions were discussed with the patient. The patient's chart has been reviewed for  allergy information and updated as necessary.   No Known Allergies    Relevant Laboratory Values  Lab Results   Component Value Date    CHOL 92 11/01/2024    CHLPL 163 03/05/2016    TRIG 230 (H) 11/01/2024    HDL 34 (L) 11/01/2024    LDL 23 11/01/2024       Current Medication List    Current Outpatient Medications:     amLODIPine (NORVASC) 5 MG tablet, Take 1 tablet by mouth Daily. for blood pressure, Disp: 90 tablet, Rfl: 3    atorvastatin (LIPITOR) 80 MG tablet, Take 1 tablet by mouth Daily., Disp: 90 tablet, Rfl: 3    buprenorphine-naloxone (SUBOXONE) 8-2 MG per  SL tablet, PLACE 2 TABLETS UNDER THE TONGUE AND LET DISSOLVE EVERY DAY AS DIRECTED, Disp: , Rfl:     carvedilol (COREG) 6.25 MG tablet, Take 1 tablet by mouth 2 (Two) Times a Day With Meals., Disp: 180 tablet, Rfl: 2    clopidogrel (PLAVIX) 75 MG tablet, Take 1 tablet by mouth Daily., Disp: 90 tablet, Rfl: 3    docusate sodium (COLACE) 100 MG capsule, Take 1 capsule by mouth Daily As Needed for Constipation., Disp: 90 capsule, Rfl: 2    fenofibrate (TRICOR) 145 MG tablet, Take 1 tablet by mouth Daily., Disp: 30 tablet, Rfl: 11    gabapentin (NEURONTIN) 400 MG capsule, TAKE ONE CAPSULE BY MOUTH THREE TIMES A DAY AS NEEDED FOR NERVE PAIN, Disp: , Rfl:     Inclisiran Sodium (Leqvio) 284 MG/1.5ML solution prefilled syringe, Inject 1.5 mL under the skin into the appropriate area as directed Every 6 (Six) Months., Disp: 1.5 mL, Rfl: 0    lisinopril (PRINIVIL,ZESTRIL) 30 MG tablet, Take 1 tablet by mouth Daily., Disp: 90 tablet, Rfl: 2    tadalafil (Cialis) 10 MG tablet, Take 1 tablet by mouth Daily As Needed for Erectile Dysfunction. Do not take more than 2 tabs in 24 hour period, Disp: 20 tablet, Rfl: 11         Drug Interactions  None with Leqvio    Goals of Therapy  LDL Reduction     Medication Assessment & Plan  Patient will continue Leqvio 284mg SC every 6 months.  Administered Leqvio 284mg SUBQ in back of left arm.  1st dose: 2/17/23  2nd dose: 5/18/23  3rd dose: 11/2/23  4th dose: 4/30/24  5th Dose: 10/25/24  6th dose: 4/17/25  Medication education provided at initial appointment.  Most recent LDL was 23 on 11/1/24.  Patient will continue regular follow-up with cardiology  Will follow up in 6 months for next injection.     Janis Marinelli, PharmD  4/17/2025  09:34 EDT

## 2025-04-17 NOTE — PROGRESS NOTES
[facility] Clinic  Lipid Management Program - Leqvio (Inclisiran)     Claude E Campbell  is a 59 y.o. male referred by their provider, Opal Ho, to the Hyperlipidemia Patient Management program offered by [facility] for Lipid Management.  Claude E Campbell is treated for ASCVD and currently takes Leqvio, atorvastatin 80mg daily, and fenofibrate.     Patient reports tolerating Leqvio well without any issues. He denies any side effects with previous dose.    Drug Coverage   Medicaid    Relevant Past Medical History and Comorbidities  Past Medical History:   Diagnosis Date    Coronary artery disease     Elevated cholesterol     Hypertension     Myocardial infarction      Social History     Socioeconomic History    Marital status:    Tobacco Use    Smoking status: Every Day     Current packs/day: 1.00     Average packs/day: 1 pack/day for 20.0 years (20.0 ttl pk-yrs)     Types: Cigarettes    Smokeless tobacco: Current     Types: Snuff   Vaping Use    Vaping status: Never Used   Substance and Sexual Activity    Alcohol use: Yes     Alcohol/week: 6.0 standard drinks of alcohol     Types: 6 Cans of beer per week     Comment: COUPLE PER DAY     Drug use: No    Sexual activity: Defer         Allergies  Known allergies and reactions were discussed with the patient. The patient's chart has been reviewed for  allergy information and updated as necessary.   No Known Allergies    Relevant Laboratory Values  Lab Results   Component Value Date    CHOL 92 11/01/2024    CHLPL 163 03/05/2016    TRIG 230 (H) 11/01/2024    HDL 34 (L) 11/01/2024    LDL 23 11/01/2024       Current Medication List    Current Outpatient Medications:     amLODIPine (NORVASC) 5 MG tablet, Take 1 tablet by mouth Daily. for blood pressure, Disp: 90 tablet, Rfl: 3    atorvastatin (LIPITOR) 80 MG tablet, Take 1 tablet by mouth Daily., Disp: 90 tablet, Rfl: 3    buprenorphine-naloxone (SUBOXONE) 8-2 MG per  SL tablet, PLACE 2 TABLETS UNDER THE TONGUE AND LET DISSOLVE EVERY DAY AS DIRECTED, Disp: , Rfl:     carvedilol (COREG) 6.25 MG tablet, Take 1 tablet by mouth 2 (Two) Times a Day With Meals., Disp: 180 tablet, Rfl: 2    clopidogrel (PLAVIX) 75 MG tablet, Take 1 tablet by mouth Daily., Disp: 90 tablet, Rfl: 3    docusate sodium (COLACE) 100 MG capsule, Take 1 capsule by mouth Daily As Needed for Constipation., Disp: 90 capsule, Rfl: 2    fenofibrate (TRICOR) 145 MG tablet, Take 1 tablet by mouth Daily., Disp: 30 tablet, Rfl: 11    gabapentin (NEURONTIN) 400 MG capsule, TAKE ONE CAPSULE BY MOUTH THREE TIMES A DAY AS NEEDED FOR NERVE PAIN, Disp: , Rfl:     Inclisiran Sodium (Leqvio) 284 MG/1.5ML solution prefilled syringe, Inject 1.5 mL under the skin into the appropriate area as directed Every 6 (Six) Months., Disp: 1.5 mL, Rfl: 0    lisinopril (PRINIVIL,ZESTRIL) 30 MG tablet, Take 1 tablet by mouth Daily., Disp: 90 tablet, Rfl: 2    tadalafil (Cialis) 10 MG tablet, Take 1 tablet by mouth Daily As Needed for Erectile Dysfunction. Do not take more than 2 tabs in 24 hour period, Disp: 20 tablet, Rfl: 11    Medicines reviewed by Janis Marinelli, Cornelia on 4/17/2025 at  9:06 AM    Drug Interactions  None with Leqvio    Goals of Therapy  LDL Reduction     Medication Assessment & Plan  Patient will continue Leqvio 284mg SC every 6 months.  Administered Leqvio 284mg SUBQ in back of left arm.  1st dose: 2/17/23  2nd dose: 5/18/23  3rd dose: 11/2/23  4th dose: 4/30/24  5th Dose: 10/25/24  6th dose: 4/17/25  Medication education provided at initial appointment.  Most recent LDL was 23 on 11/1/24.  Patient will continue regular follow-up with cardiology  Will follow up in 6 months for next injection.     Janis Marinelli PharmD  4/17/2025  09:31 EDT

## 2025-04-23 ENCOUNTER — OFFICE VISIT (OUTPATIENT)
Dept: CARDIOLOGY | Facility: CLINIC | Age: 60
End: 2025-04-23
Payer: MEDICAID

## 2025-04-23 VITALS
HEART RATE: 58 BPM | WEIGHT: 177 LBS | HEIGHT: 72 IN | SYSTOLIC BLOOD PRESSURE: 132 MMHG | BODY MASS INDEX: 23.98 KG/M2 | OXYGEN SATURATION: 96 % | DIASTOLIC BLOOD PRESSURE: 82 MMHG

## 2025-04-23 DIAGNOSIS — E78.2 MIXED HYPERLIPIDEMIA: ICD-10-CM

## 2025-04-23 DIAGNOSIS — I25.10 CORONARY ARTERY DISEASE INVOLVING NATIVE CORONARY ARTERY OF NATIVE HEART WITHOUT ANGINA PECTORIS: Primary | Chronic | ICD-10-CM

## 2025-04-23 DIAGNOSIS — I10 ESSENTIAL HYPERTENSION: Chronic | ICD-10-CM

## 2025-04-23 DIAGNOSIS — E78.5 DYSLIPIDEMIA, GOAL LDL BELOW 70: Chronic | ICD-10-CM

## 2025-04-23 DIAGNOSIS — E78.1 HYPERTRIGLYCERIDEMIA: Chronic | ICD-10-CM

## 2025-04-23 RX ORDER — LISINOPRIL 30 MG/1
30 TABLET ORAL DAILY
Qty: 90 TABLET | Refills: 2 | Status: CANCELLED | OUTPATIENT
Start: 2025-04-23

## 2025-04-23 RX ORDER — AMLODIPINE BESYLATE 5 MG/1
5 TABLET ORAL DAILY
Qty: 90 TABLET | Refills: 3 | Status: CANCELLED | OUTPATIENT
Start: 2025-04-23

## 2025-04-23 RX ORDER — CLOPIDOGREL BISULFATE 75 MG/1
75 TABLET ORAL DAILY
Qty: 90 TABLET | Refills: 3 | Status: SHIPPED | OUTPATIENT
Start: 2025-04-23

## 2025-04-23 RX ORDER — CARVEDILOL 6.25 MG/1
6.25 TABLET ORAL 2 TIMES DAILY WITH MEALS
Qty: 180 TABLET | Refills: 2 | Status: CANCELLED | OUTPATIENT
Start: 2025-04-23

## 2025-04-23 RX ORDER — ATORVASTATIN CALCIUM 80 MG/1
80 TABLET, FILM COATED ORAL DAILY
Qty: 90 TABLET | Refills: 3 | Status: CANCELLED | OUTPATIENT
Start: 2025-04-23

## 2025-04-23 RX ORDER — AMLODIPINE BESYLATE 10 MG/1
10 TABLET ORAL DAILY
Qty: 90 TABLET | Refills: 3 | Status: SHIPPED | OUTPATIENT
Start: 2025-04-23

## 2025-04-23 RX ORDER — FENOFIBRATE 145 MG/1
145 TABLET, FILM COATED ORAL DAILY
Start: 2025-04-23

## 2025-04-23 RX ORDER — INCLISIRAN 284 MG/1.5ML
284 INJECTION, SOLUTION SUBCUTANEOUS
Start: 2025-04-23

## 2025-04-23 NOTE — PROGRESS NOTES
"Chief Complaint  Follow-up (Follow up for cardiac management)    Subjective          Claude E Campbell presents to [facility]OLOG[facility] for follow up.    History of Present Illness  History of Present Illness  The patient is a 59-year-old male who follows up in the clinic for coronary artery disease, dyslipidemia, and hypertension. He was last seen in the clinic on 10/29/2024. An echocardiogram was ordered for surveillance, but it has not yet been completed.    No recent episodes of chest pain or shortness of breath are reported. He maintains an active lifestyle, including daily work activities, and reports no peripheral edema. Medication adherence is reported without any issues. He recently received an injection of inclisiran. Current medications include fenofibrate, atorvastatin, Plavix, carvedilol, amlodipine, and lisinopril. A refill for Plavix is required.    The scheduled echocardiogram was missed due to travel commitments, and he expresses a desire to reschedule it.    Overall, he reports feeling well with no specific concerns at this time. Systolic blood pressure has been consistently lower than usual, averaging around 132, which is a significant decrease from typical home readings of approximately 140/80. Amlodipine 5 mg is taken once daily in the evening.    For the past week, he has experienced chest congestion, characterized by excessive phlegm production and frequent nose blowing. These symptoms are being managed with amoxicillin prescribed by his primary care physician. The phlegm is now breaking up and being expelled.    He continues to smoke and has initiated Chantix therapy as part of a smoking cessation program.    SOCIAL HISTORY  Occupations: Works in construction, specifically sanding floors.  Tobacco: Admits to smoking.         Objective     Vital Signs:   /82 (BP Location: Left arm, Patient Position: Sitting, Cuff Size: Adult)   Pulse 58   Ht 182.9 cm (72\")   Wt 80.3 " kg (177 lb)   SpO2 96%   BMI 24.01 kg/m²       Physical Exam  Constitutional:       Appearance: Normal appearance. He is well-developed.   Cardiovascular:      Rate and Rhythm: Normal rate and regular rhythm.      Heart sounds: No murmur heard.     No friction rub. No gallop.   Pulmonary:      Effort: Pulmonary effort is normal. No respiratory distress.      Breath sounds: Stridor: expiratory rhonchi throughout. Rhonchi present. No wheezing or rales.   Skin:     General: Skin is warm and dry.   Neurological:      Mental Status: He is alert and oriented to person, place, and time.   Psychiatric:         Mood and Affect: Mood normal.         Behavior: Behavior normal.          Result Review :         Lipid Panel          11/1/2024    10:10   Lipid Panel   Total Cholesterol 92    Triglycerides 230    HDL Cholesterol 34    VLDL Cholesterol 35    LDL Cholesterol  23    LDL/HDL Ratio 0.35               Most recent echocardiogram  Results for orders placed during the hospital encounter of 06/23/20    Adult Transthoracic Echo Complete W/ Cont if Necessary Per Protocol    Interpretation Summary  · Left ventricular wall thickness is consistent with mild concentric hypertrophy.  · Left ventricular systolic function is preserved, EF 51-55%.  · Mild tricuspid valve regurgitation is present.      Most recent Stress Test  Results for orders placed during the hospital encounter of 06/23/20    Stress Test With Myocardial Perfusion One Day    Interpretation Summary  · Left ventricular ejection fraction is mildly reduced (Calculated EF = 47%).  · Myocardial perfusion imaging indicates a located in the inferior wall and lateral wall with no significant ischemia noted.  · Impressions are consistent with an intermediate risk study.  · Findings consistent with a normal ECG stress test.  · Findings consistent with large infarction in inferolateral segment with minimal arturo-infarction ischemia.  · Medical manangement is appropriate if  clinically correlates.         Current Outpatient Medications   Medication Sig Dispense Refill    amLODIPine (NORVASC) 10 MG tablet Take 1 tablet by mouth Daily. for blood pressure 90 tablet 3    atorvastatin (LIPITOR) 80 MG tablet Take 1 tablet by mouth Daily. 90 tablet 3    buprenorphine-naloxone (SUBOXONE) 8-2 MG per SL tablet PLACE 2 TABLETS UNDER THE TONGUE AND LET DISSOLVE EVERY DAY AS DIRECTED      carvedilol (COREG) 6.25 MG tablet Take 1 tablet by mouth 2 (Two) Times a Day With Meals. 180 tablet 2    clopidogrel (PLAVIX) 75 MG tablet Take 1 tablet by mouth Daily. 90 tablet 3    docusate sodium (COLACE) 100 MG capsule Take 1 capsule by mouth Daily As Needed for Constipation. 90 capsule 2    fenofibrate (TRICOR) 145 MG tablet Take 1 tablet by mouth Daily.      gabapentin (NEURONTIN) 400 MG capsule TAKE ONE CAPSULE BY MOUTH THREE TIMES A DAY AS NEEDED FOR NERVE PAIN      Inclisiran Sodium (Leqvio) 284 MG/1.5ML solution prefilled syringe Inject 1.5 mL under the skin into the appropriate area as directed Every 6 (Six) Months.      lisinopril (PRINIVIL,ZESTRIL) 30 MG tablet Take 1 tablet by mouth Daily. 90 tablet 2    tadalafil (Cialis) 10 MG tablet Take 1 tablet by mouth Daily As Needed for Erectile Dysfunction. Do not take more than 2 tabs in 24 hour period 20 tablet 11     No current facility-administered medications for this visit.               Problem List Items Addressed This Visit          Cardiac and Vasculature    Coronary artery disease  - Primary (Chronic)    Overview   3/5/2016 [facility] for MI: PCI ABNER to the RCA  6/4/2020 nuclear stress test: Myocardial perfusion imaging indicates an infarct in the inferior wall and lateral wall with no significant ischemia  6/23/2020 TTE: LVEF 51 to 55%, mild TR, mild concentric hypertrophy         Relevant Medications    clopidogrel (PLAVIX) 75 MG tablet    amLODIPine (NORVASC) 10 MG tablet    Dyslipidemia, goal LDL below 55 (Chronic)    Overview   6/24/2020 total  cholesterol 145, triglycerides 150, HDL 46, and LDL 69  6/30/2021 total cholesterol 156, triglycerides 486, HDL 34, and LDL 50  1/11/2022 total cholesterol 163, triglycerides 288, HDL 36, and LDL 80  2/17/2023 first dose of Leqvio  7/5/2023 total cholesterol 105, triglycerides 176, HDL 43, and LDL 33  11/1/2024 total cholesterol 92, triglycerides 230, HDL 34, and LDL 23         Relevant Medications    fenofibrate (TRICOR) 145 MG tablet    Inclisiran Sodium (Leqvio) 284 MG/1.5ML solution prefilled syringe    Essential hypertension (Chronic)    Relevant Medications    amLODIPine (NORVASC) 10 MG tablet    Hypertriglyceridemia (Chronic)    Relevant Medications    fenofibrate (TRICOR) 145 MG tablet    Inclisiran Sodium (Leqvio) 284 MG/1.5ML solution prefilled syringe     Other Visit Diagnoses         Mixed hyperlipidemia        Relevant Medications    fenofibrate (TRICOR) 145 MG tablet    Inclisiran Sodium (Leqvio) 284 MG/1.5ML solution prefilled syringe            Assessment & Plan  1. Coronary Artery Disease:   No recent chest pain or shortness of breath   Able to perform daily activities without issues   Prescription for Plavix refill to be sent to pharmacy    2. Dyslipidemia:   Continue inclisiran, fenofibrate, and atorvastatin    3. Hypertension:   Blood pressure readings at home averaging 140/80, today 132/82   Increase amlodipine dosage from 5 mg to 10 mg daily   Prescription to be sent to pharmacy    4. Smoking Cessation:   Has first round of Chantix but not yet taken   Advised to quit smoking to improve cardiac and respiratory health and overall well-being    6. Health Maintenance:   Echocardiogram to be rescheduled    Follow-up  Follow up in 6 months.         Follow Up     Return in about 6 months (around 10/23/2025).    Patient was given instructions and counseling regarding his condition or for health maintenance advice. Please see specific information pulled into the AVS if appropriate.     Patient or  patient representative verbalized consent for the use of Ambient Listening during the visit with  VISHAL Keys for chart documentation. 4/26/2025  15:11 EDT

## (undated) DEVICE — Device

## (undated) DEVICE — GOWN,REINF,POLY,ECL,PP SLV,XL: Brand: MEDLINE

## (undated) DEVICE — CONN Y IRR DISP 1P/U

## (undated) DEVICE — SUCTION CANISTER, 1500CC, RIGID: Brand: DEROYAL

## (undated) DEVICE — SYR LUERLOK 30CC

## (undated) DEVICE — SINGLE PORT MANIFOLD: Brand: NEPTUNE 2

## (undated) DEVICE — TUBING, SUCTION, 3/16" X 10', STRAIGHT: Brand: MEDLINE

## (undated) DEVICE — ENDOGATOR AUXILIARY WATER JET CONNECTOR: Brand: ENDOGATOR

## (undated) DEVICE — Device: Brand: DEFENDO AIR/WATER/SUCTION AND BIOPSY VALVE

## (undated) DEVICE — ENDOCUFF VISION PRP SM 10.4

## (undated) DEVICE — TUBING, SUCTION, 1/4" X 20', STRAIGHT: Brand: MEDLINE INDUSTRIES, INC.